# Patient Record
Sex: FEMALE | Race: WHITE | NOT HISPANIC OR LATINO | Employment: FULL TIME | URBAN - METROPOLITAN AREA
[De-identification: names, ages, dates, MRNs, and addresses within clinical notes are randomized per-mention and may not be internally consistent; named-entity substitution may affect disease eponyms.]

---

## 2017-11-16 ENCOUNTER — GENERIC CONVERSION - ENCOUNTER (OUTPATIENT)
Dept: OTHER | Facility: OTHER | Age: 57
End: 2017-11-16

## 2017-11-16 DIAGNOSIS — Z00.00 ENCOUNTER FOR GENERAL ADULT MEDICAL EXAMINATION WITHOUT ABNORMAL FINDINGS: ICD-10-CM

## 2017-11-16 DIAGNOSIS — Z12.31 ENCOUNTER FOR SCREENING MAMMOGRAM FOR MALIGNANT NEOPLASM OF BREAST: ICD-10-CM

## 2018-01-12 NOTE — MISCELLANEOUS
Message   Recorded as Task   Date: 05/25/2016 08:03 AM, Created By: Navjot Mujica   Task Name: Miscellaneous   Assigned To: Laya Summers   Regarding Patient: Ana Marin, Status: Active   CommentHira Samson - 25 May 2016 8:03 AM     TASK CREATED    [facility] called for loretta rx for pt,not scheduled for yrly yet,put order in allscripts        Active Problems    1  Abnormal finding on mammography (793 80) (R92 8)   2  Abnormal glandular Papanicolaou smear of cervix (795 00) (R87 619)   3  Adult-onset obesity (278 00) (E66 9)   4  Bacterial vaginosis (616 10,041 9) (N76 0,B96 89)   5  Cyst of breast, unspecified laterality (610 0) (N60 09)   6  Encounter for routine gynecological examination (V72 31) (Z01 419)   7  Encounter for screening mammogram for malignant neoplasm of breast (V76 12)   (Z12 31)   8  Human papilloma virus infection (079 4) (B97 7)   9  Lack of libido (799 81) (F52 0)   10  Menorrhagia (626 2) (N92 0)   11  Pap smear abnormality of cervix with ASCUS favoring benign (795 01) (R87 610)   12  Pregnancy (V22 2) (Z33 1)   13  Screening for HPV (human papillomavirus) (V73 81) (Z11 51)    Current Meds   1  Multivitamins TABS; daily; Therapy: [id number] to (Last Rx:14Nov2011)  Requested for: [id number] Ordered    Allergies    1  No Known Drug Allergies    Plan  Encounter for screening mammogram for malignant neoplasm of breast    · * MAMMO SCREENING BILATERAL W CAD; Status:Hold For - Scheduling,Retrospective  By Protocol Authorization;  Requested for:[id number];     Signatures   Electronically signed by : Maria Antonia Almaguer, ; May 25 2016  8:04AM EST                       (Author)

## 2018-01-22 VITALS
SYSTOLIC BLOOD PRESSURE: 132 MMHG | HEIGHT: 61 IN | DIASTOLIC BLOOD PRESSURE: 78 MMHG | WEIGHT: 148.4 LBS | BODY MASS INDEX: 28.02 KG/M2

## 2020-07-03 ENCOUNTER — HOSPITAL ENCOUNTER (EMERGENCY)
Facility: HOSPITAL | Age: 60
End: 2020-07-04
Attending: EMERGENCY MEDICINE
Payer: COMMERCIAL

## 2020-07-03 DIAGNOSIS — K81.9 CHOLECYSTITIS: Primary | ICD-10-CM

## 2020-07-03 LAB
BASOPHILS # BLD AUTO: 0.03 THOUSANDS/ΜL (ref 0–0.1)
BASOPHILS NFR BLD AUTO: 0 % (ref 0–1)
EOSINOPHIL # BLD AUTO: 0.04 THOUSAND/ΜL (ref 0–0.61)
EOSINOPHIL NFR BLD AUTO: 0 % (ref 0–6)
ERYTHROCYTE [DISTWIDTH] IN BLOOD BY AUTOMATED COUNT: 13.6 % (ref 11.6–15.1)
HCT VFR BLD AUTO: 49.6 % (ref 34.8–46.1)
HGB BLD-MCNC: 15.8 G/DL (ref 11.5–15.4)
IMM GRANULOCYTES # BLD AUTO: 0.07 THOUSAND/UL (ref 0–0.2)
IMM GRANULOCYTES NFR BLD AUTO: 0 % (ref 0–2)
LYMPHOCYTES # BLD AUTO: 1.95 THOUSANDS/ΜL (ref 0.6–4.47)
LYMPHOCYTES NFR BLD AUTO: 9 % (ref 14–44)
MCH RBC QN AUTO: 28.4 PG (ref 26.8–34.3)
MCHC RBC AUTO-ENTMCNC: 31.9 G/DL (ref 31.4–37.4)
MCV RBC AUTO: 89 FL (ref 82–98)
MONOCYTES # BLD AUTO: 0.83 THOUSAND/ΜL (ref 0.17–1.22)
MONOCYTES NFR BLD AUTO: 4 % (ref 4–12)
NEUTROPHILS # BLD AUTO: 18.75 THOUSANDS/ΜL (ref 1.85–7.62)
NEUTS SEG NFR BLD AUTO: 87 % (ref 43–75)
PLATELET # BLD AUTO: 253 THOUSANDS/UL (ref 149–390)
PMV BLD AUTO: 11.9 FL (ref 8.9–12.7)
RBC # BLD AUTO: 5.57 MILLION/UL (ref 3.81–5.12)
WBC # BLD AUTO: 21.67 THOUSAND/UL (ref 4.31–10.16)

## 2020-07-03 PROCEDURE — 81001 URINALYSIS AUTO W/SCOPE: CPT | Performed by: EMERGENCY MEDICINE

## 2020-07-03 PROCEDURE — 99285 EMERGENCY DEPT VISIT HI MDM: CPT

## 2020-07-03 PROCEDURE — 36415 COLL VENOUS BLD VENIPUNCTURE: CPT | Performed by: EMERGENCY MEDICINE

## 2020-07-03 PROCEDURE — 99285 EMERGENCY DEPT VISIT HI MDM: CPT | Performed by: EMERGENCY MEDICINE

## 2020-07-03 PROCEDURE — 80053 COMPREHEN METABOLIC PANEL: CPT | Performed by: EMERGENCY MEDICINE

## 2020-07-03 PROCEDURE — 83690 ASSAY OF LIPASE: CPT | Performed by: EMERGENCY MEDICINE

## 2020-07-03 PROCEDURE — 85025 COMPLETE CBC W/AUTO DIFF WBC: CPT | Performed by: EMERGENCY MEDICINE

## 2020-07-04 ENCOUNTER — ANESTHESIA (INPATIENT)
Dept: PERIOP | Facility: HOSPITAL | Age: 60
End: 2020-07-04
Payer: COMMERCIAL

## 2020-07-04 ENCOUNTER — ANESTHESIA EVENT (INPATIENT)
Dept: PERIOP | Facility: HOSPITAL | Age: 60
End: 2020-07-04
Payer: COMMERCIAL

## 2020-07-04 ENCOUNTER — APPOINTMENT (EMERGENCY)
Dept: CT IMAGING | Facility: HOSPITAL | Age: 60
End: 2020-07-04
Payer: COMMERCIAL

## 2020-07-04 ENCOUNTER — APPOINTMENT (INPATIENT)
Dept: RADIOLOGY | Facility: HOSPITAL | Age: 60
End: 2020-07-04
Payer: COMMERCIAL

## 2020-07-04 ENCOUNTER — HOSPITAL ENCOUNTER (OUTPATIENT)
Facility: HOSPITAL | Age: 60
Setting detail: OUTPATIENT SURGERY
Discharge: HOME | End: 2020-07-05
Attending: SPECIALIST | Admitting: SPECIALIST
Payer: COMMERCIAL

## 2020-07-04 ENCOUNTER — APPOINTMENT (EMERGENCY)
Dept: RADIOLOGY | Facility: HOSPITAL | Age: 60
End: 2020-07-04
Payer: COMMERCIAL

## 2020-07-04 VITALS
HEART RATE: 97 BPM | RESPIRATION RATE: 18 BRPM | WEIGHT: 140 LBS | DIASTOLIC BLOOD PRESSURE: 97 MMHG | SYSTOLIC BLOOD PRESSURE: 170 MMHG | HEIGHT: 62 IN | TEMPERATURE: 97 F | OXYGEN SATURATION: 96 % | BODY MASS INDEX: 25.76 KG/M2

## 2020-07-04 DIAGNOSIS — Z90.49 S/P LAPAROSCOPIC CHOLECYSTECTOMY: ICD-10-CM

## 2020-07-04 DIAGNOSIS — K80.00 ACUTE CHOLECYSTITIS DUE TO BILIARY CALCULUS: Primary | ICD-10-CM

## 2020-07-04 PROBLEM — K80.20 GALL STONES: Status: ACTIVE | Noted: 2020-07-04

## 2020-07-04 LAB
ALBUMIN SERPL BCP-MCNC: 4.2 G/DL (ref 3.5–5)
ALBUMIN SERPL BCP-MCNC: 4.7 G/DL (ref 3.4–4.8)
ALP SERPL-CCNC: 125 U/L (ref 46–116)
ALP SERPL-CCNC: 99.8 U/L (ref 35–140)
ALT SERPL W P-5'-P-CCNC: 19 U/L (ref 5–54)
ALT SERPL W P-5'-P-CCNC: 30 U/L (ref 12–78)
ANION GAP SERPL CALCULATED.3IONS-SCNC: 10 MMOL/L (ref 4–13)
ANION GAP SERPL CALCULATED.3IONS-SCNC: 11 MMOL/L (ref 4–13)
AST SERPL W P-5'-P-CCNC: 18 U/L (ref 5–45)
AST SERPL W P-5'-P-CCNC: 23 U/L (ref 15–41)
BACTERIA UR QL AUTO: ABNORMAL /HPF
BASOPHILS # BLD AUTO: 0.04 THOUSANDS/ΜL (ref 0–0.1)
BASOPHILS NFR BLD AUTO: 0 % (ref 0–1)
BILIRUB SERPL-MCNC: 0.39 MG/DL (ref 0.3–1.2)
BILIRUB SERPL-MCNC: 0.5 MG/DL (ref 0.2–1)
BILIRUB UR QL STRIP: NEGATIVE
BUN SERPL-MCNC: 12 MG/DL (ref 5–25)
BUN SERPL-MCNC: 16 MG/DL (ref 6–20)
CALCIUM SERPL-MCNC: 10 MG/DL (ref 8.4–10.2)
CALCIUM SERPL-MCNC: 9.2 MG/DL (ref 8.3–10.1)
CHLORIDE SERPL-SCNC: 102 MMOL/L (ref 100–108)
CHLORIDE SERPL-SCNC: 104 MMOL/L (ref 96–108)
CLARITY UR: CLEAR
CO2 SERPL-SCNC: 24 MMOL/L (ref 22–33)
CO2 SERPL-SCNC: 25 MMOL/L (ref 21–32)
COLOR UR: YELLOW
CREAT SERPL-MCNC: 0.85 MG/DL (ref 0.4–1.1)
CREAT SERPL-MCNC: 0.88 MG/DL (ref 0.6–1.3)
EOSINOPHIL # BLD AUTO: 0 THOUSAND/ΜL (ref 0–0.61)
EOSINOPHIL NFR BLD AUTO: 0 % (ref 0–6)
ERYTHROCYTE [DISTWIDTH] IN BLOOD BY AUTOMATED COUNT: 13.3 % (ref 11.6–15.1)
GFR SERPL CREATININE-BSD FRML MDRD: 73 ML/MIN/1.73SQ M
GFR SERPL CREATININE-BSD FRML MDRD: 76 ML/MIN/1.73SQ M
GLUCOSE SERPL-MCNC: 147 MG/DL (ref 65–140)
GLUCOSE SERPL-MCNC: 158 MG/DL (ref 65–140)
GLUCOSE UR STRIP-MCNC: NEGATIVE MG/DL
HCT VFR BLD AUTO: 49.8 % (ref 34.8–46.1)
HGB BLD-MCNC: 15.7 G/DL (ref 11.5–15.4)
HGB UR QL STRIP.AUTO: ABNORMAL
IMM GRANULOCYTES # BLD AUTO: 0.13 THOUSAND/UL (ref 0–0.2)
IMM GRANULOCYTES NFR BLD AUTO: 1 % (ref 0–2)
KETONES UR STRIP-MCNC: NEGATIVE MG/DL
LEUKOCYTE ESTERASE UR QL STRIP: NEGATIVE
LIPASE SERPL-CCNC: 8 U/L (ref 13–60)
LYMPHOCYTES # BLD AUTO: 1.37 THOUSANDS/ΜL (ref 0.6–4.47)
LYMPHOCYTES NFR BLD AUTO: 7 % (ref 14–44)
MCH RBC QN AUTO: 29 PG (ref 26.8–34.3)
MCHC RBC AUTO-ENTMCNC: 31.5 G/DL (ref 31.4–37.4)
MCV RBC AUTO: 92 FL (ref 82–98)
MONOCYTES # BLD AUTO: 0.92 THOUSAND/ΜL (ref 0.17–1.22)
MONOCYTES NFR BLD AUTO: 5 % (ref 4–12)
MUCOUS THREADS UR QL AUTO: ABNORMAL
NEUTROPHILS # BLD AUTO: 17.65 THOUSANDS/ΜL (ref 1.85–7.62)
NEUTS SEG NFR BLD AUTO: 87 % (ref 43–75)
NITRITE UR QL STRIP: NEGATIVE
NON-SQ EPI CELLS URNS QL MICRO: ABNORMAL /HPF
NRBC BLD AUTO-RTO: 0 /100 WBCS
PH UR STRIP.AUTO: 5 [PH]
PLATELET # BLD AUTO: 275 THOUSANDS/UL (ref 149–390)
PMV BLD AUTO: 11.9 FL (ref 8.9–12.7)
POTASSIUM SERPL-SCNC: 4 MMOL/L (ref 3.5–5.3)
POTASSIUM SERPL-SCNC: 4.6 MMOL/L (ref 3.5–5)
PROT SERPL-MCNC: 7.8 G/DL (ref 6.4–8.3)
PROT SERPL-MCNC: 8.1 G/DL (ref 6.4–8.2)
PROT UR STRIP-MCNC: NEGATIVE MG/DL
RBC # BLD AUTO: 5.42 MILLION/UL (ref 3.81–5.12)
RBC #/AREA URNS AUTO: ABNORMAL /HPF
SARS-COV-2 RNA RESP QL NAA+PROBE: NEGATIVE
SODIUM SERPL-SCNC: 138 MMOL/L (ref 133–145)
SODIUM SERPL-SCNC: 138 MMOL/L (ref 136–145)
SP GR UR STRIP.AUTO: >=1.03 (ref 1–1.03)
UROBILINOGEN UR QL STRIP.AUTO: 0.2 E.U./DL
WBC # BLD AUTO: 20.11 THOUSAND/UL (ref 4.31–10.16)
WBC #/AREA URNS AUTO: ABNORMAL /HPF

## 2020-07-04 PROCEDURE — 80053 COMPREHEN METABOLIC PANEL: CPT | Performed by: SPECIALIST

## 2020-07-04 PROCEDURE — 87635 SARS-COV-2 COVID-19 AMP PRB: CPT | Performed by: SPECIALIST

## 2020-07-04 PROCEDURE — 76705 ECHO EXAM OF ABDOMEN: CPT

## 2020-07-04 PROCEDURE — 96374 THER/PROPH/DIAG INJ IV PUSH: CPT

## 2020-07-04 PROCEDURE — 87205 SMEAR GRAM STAIN: CPT | Performed by: SPECIALIST

## 2020-07-04 PROCEDURE — 47562 LAPAROSCOPIC CHOLECYSTECTOMY: CPT | Performed by: PHYSICIAN ASSISTANT

## 2020-07-04 PROCEDURE — 47562 LAPAROSCOPIC CHOLECYSTECTOMY: CPT | Performed by: SPECIALIST

## 2020-07-04 PROCEDURE — 99221 1ST HOSP IP/OBS SF/LOW 40: CPT | Performed by: PHYSICIAN ASSISTANT

## 2020-07-04 PROCEDURE — 88304 TISSUE EXAM BY PATHOLOGIST: CPT | Performed by: PATHOLOGY

## 2020-07-04 PROCEDURE — 96376 TX/PRO/DX INJ SAME DRUG ADON: CPT

## 2020-07-04 PROCEDURE — 71045 X-RAY EXAM CHEST 1 VIEW: CPT

## 2020-07-04 PROCEDURE — 71275 CT ANGIOGRAPHY CHEST: CPT

## 2020-07-04 PROCEDURE — 87070 CULTURE OTHR SPECIMN AEROBIC: CPT | Performed by: SPECIALIST

## 2020-07-04 PROCEDURE — 74174 CTA ABD&PLVS W/CONTRAST: CPT

## 2020-07-04 PROCEDURE — 85025 COMPLETE CBC W/AUTO DIFF WBC: CPT | Performed by: SPECIALIST

## 2020-07-04 RX ORDER — SODIUM CHLORIDE, SODIUM LACTATE, POTASSIUM CHLORIDE, CALCIUM CHLORIDE 600; 310; 30; 20 MG/100ML; MG/100ML; MG/100ML; MG/100ML
INJECTION, SOLUTION INTRAVENOUS CONTINUOUS PRN
Status: DISCONTINUED | OUTPATIENT
Start: 2020-07-04 | End: 2020-07-04 | Stop reason: SURG

## 2020-07-04 RX ORDER — MORPHINE SULFATE 4 MG/ML
4 INJECTION, SOLUTION INTRAMUSCULAR; INTRAVENOUS ONCE
Status: COMPLETED | OUTPATIENT
Start: 2020-07-04 | End: 2020-07-04

## 2020-07-04 RX ORDER — KETOROLAC TROMETHAMINE 30 MG/ML
15 INJECTION, SOLUTION INTRAMUSCULAR; INTRAVENOUS EVERY 6 HOURS SCHEDULED
Status: DISCONTINUED | OUTPATIENT
Start: 2020-07-04 | End: 2020-07-05 | Stop reason: HOSPADM

## 2020-07-04 RX ORDER — BUPIVACAINE HYDROCHLORIDE AND EPINEPHRINE 5; 5 MG/ML; UG/ML
INJECTION, SOLUTION EPIDURAL; INTRACAUDAL; PERINEURAL AS NEEDED
Status: DISCONTINUED | OUTPATIENT
Start: 2020-07-04 | End: 2020-07-04 | Stop reason: HOSPADM

## 2020-07-04 RX ORDER — CEFAZOLIN SODIUM 2 G/50ML
2000 SOLUTION INTRAVENOUS ONCE
Status: DISCONTINUED | OUTPATIENT
Start: 2020-07-04 | End: 2020-07-04 | Stop reason: HOSPADM

## 2020-07-04 RX ORDER — ONDANSETRON 2 MG/ML
INJECTION INTRAMUSCULAR; INTRAVENOUS AS NEEDED
Status: DISCONTINUED | OUTPATIENT
Start: 2020-07-04 | End: 2020-07-04 | Stop reason: SURG

## 2020-07-04 RX ORDER — OXYCODONE HYDROCHLORIDE 5 MG/1
5 TABLET ORAL EVERY 4 HOURS PRN
Status: DISCONTINUED | OUTPATIENT
Start: 2020-07-04 | End: 2020-07-05 | Stop reason: HOSPADM

## 2020-07-04 RX ORDER — SODIUM CHLORIDE, SODIUM LACTATE, POTASSIUM CHLORIDE, CALCIUM CHLORIDE 600; 310; 30; 20 MG/100ML; MG/100ML; MG/100ML; MG/100ML
50 INJECTION, SOLUTION INTRAVENOUS CONTINUOUS
Status: DISCONTINUED | OUTPATIENT
Start: 2020-07-04 | End: 2020-07-04

## 2020-07-04 RX ORDER — ONDANSETRON 2 MG/ML
4 INJECTION INTRAMUSCULAR; INTRAVENOUS ONCE AS NEEDED
Status: DISCONTINUED | OUTPATIENT
Start: 2020-07-04 | End: 2020-07-04 | Stop reason: HOSPADM

## 2020-07-04 RX ORDER — SODIUM CHLORIDE, SODIUM LACTATE, POTASSIUM CHLORIDE, CALCIUM CHLORIDE 600; 310; 30; 20 MG/100ML; MG/100ML; MG/100ML; MG/100ML
75 INJECTION, SOLUTION INTRAVENOUS CONTINUOUS
Status: DISCONTINUED | OUTPATIENT
Start: 2020-07-04 | End: 2020-07-04

## 2020-07-04 RX ORDER — NEOSTIGMINE METHYLSULFATE 1 MG/ML
INJECTION INTRAVENOUS AS NEEDED
Status: DISCONTINUED | OUTPATIENT
Start: 2020-07-04 | End: 2020-07-04 | Stop reason: SURG

## 2020-07-04 RX ORDER — LIDOCAINE HYDROCHLORIDE 10 MG/ML
INJECTION, SOLUTION EPIDURAL; INFILTRATION; INTRACAUDAL; PERINEURAL AS NEEDED
Status: DISCONTINUED | OUTPATIENT
Start: 2020-07-04 | End: 2020-07-04 | Stop reason: SURG

## 2020-07-04 RX ORDER — OXYCODONE HYDROCHLORIDE 10 MG/1
10 TABLET ORAL EVERY 4 HOURS PRN
Status: DISCONTINUED | OUTPATIENT
Start: 2020-07-04 | End: 2020-07-05 | Stop reason: HOSPADM

## 2020-07-04 RX ORDER — HYDROMORPHONE HCL/PF 1 MG/ML
SYRINGE (ML) INJECTION AS NEEDED
Status: DISCONTINUED | OUTPATIENT
Start: 2020-07-04 | End: 2020-07-04 | Stop reason: SURG

## 2020-07-04 RX ORDER — DEXTROSE AND SODIUM CHLORIDE 5; .45 G/100ML; G/100ML
125 INJECTION, SOLUTION INTRAVENOUS CONTINUOUS
Status: DISCONTINUED | OUTPATIENT
Start: 2020-07-04 | End: 2020-07-04

## 2020-07-04 RX ORDER — ROCURONIUM BROMIDE 10 MG/ML
INJECTION, SOLUTION INTRAVENOUS AS NEEDED
Status: DISCONTINUED | OUTPATIENT
Start: 2020-07-04 | End: 2020-07-04 | Stop reason: SURG

## 2020-07-04 RX ORDER — HYDROMORPHONE HCL/PF 1 MG/ML
0.2 SYRINGE (ML) INJECTION
Status: DISCONTINUED | OUTPATIENT
Start: 2020-07-04 | End: 2020-07-05 | Stop reason: HOSPADM

## 2020-07-04 RX ORDER — FENTANYL CITRATE 50 UG/ML
INJECTION, SOLUTION INTRAMUSCULAR; INTRAVENOUS AS NEEDED
Status: DISCONTINUED | OUTPATIENT
Start: 2020-07-04 | End: 2020-07-04 | Stop reason: SURG

## 2020-07-04 RX ORDER — DEXAMETHASONE SODIUM PHOSPHATE 4 MG/ML
INJECTION, SOLUTION INTRA-ARTICULAR; INTRALESIONAL; INTRAMUSCULAR; INTRAVENOUS; SOFT TISSUE AS NEEDED
Status: DISCONTINUED | OUTPATIENT
Start: 2020-07-04 | End: 2020-07-04 | Stop reason: SURG

## 2020-07-04 RX ORDER — METRONIDAZOLE 500 MG/1
500 TABLET ORAL ONCE
Status: DISCONTINUED | OUTPATIENT
Start: 2020-07-04 | End: 2020-07-04 | Stop reason: HOSPADM

## 2020-07-04 RX ORDER — ACETAMINOPHEN 325 MG/1
650 TABLET ORAL EVERY 6 HOURS PRN
Status: DISCONTINUED | OUTPATIENT
Start: 2020-07-04 | End: 2020-07-05 | Stop reason: HOSPADM

## 2020-07-04 RX ORDER — ONDANSETRON 2 MG/ML
4 INJECTION INTRAMUSCULAR; INTRAVENOUS EVERY 4 HOURS PRN
Status: DISCONTINUED | OUTPATIENT
Start: 2020-07-04 | End: 2020-07-05 | Stop reason: HOSPADM

## 2020-07-04 RX ORDER — GLYCOPYRROLATE 0.2 MG/ML
INJECTION INTRAMUSCULAR; INTRAVENOUS AS NEEDED
Status: DISCONTINUED | OUTPATIENT
Start: 2020-07-04 | End: 2020-07-04 | Stop reason: SURG

## 2020-07-04 RX ORDER — OXYCODONE HYDROCHLORIDE 5 MG/1
5 TABLET ORAL EVERY 4 HOURS PRN
Qty: 10 TABLET | Refills: 0 | Status: SHIPPED | OUTPATIENT
Start: 2020-07-04 | End: 2020-07-05 | Stop reason: HOSPADM

## 2020-07-04 RX ORDER — MIDAZOLAM HYDROCHLORIDE 2 MG/2ML
INJECTION, SOLUTION INTRAMUSCULAR; INTRAVENOUS AS NEEDED
Status: DISCONTINUED | OUTPATIENT
Start: 2020-07-04 | End: 2020-07-04 | Stop reason: SURG

## 2020-07-04 RX ORDER — HYDROMORPHONE HCL/PF 1 MG/ML
1 SYRINGE (ML) INJECTION EVERY 4 HOURS PRN
Status: DISCONTINUED | OUTPATIENT
Start: 2020-07-04 | End: 2020-07-04

## 2020-07-04 RX ORDER — HYDROMORPHONE HCL/PF 1 MG/ML
0.4 SYRINGE (ML) INJECTION
Status: DISCONTINUED | OUTPATIENT
Start: 2020-07-04 | End: 2020-07-04 | Stop reason: HOSPADM

## 2020-07-04 RX ORDER — PROPOFOL 10 MG/ML
INJECTION, EMULSION INTRAVENOUS AS NEEDED
Status: DISCONTINUED | OUTPATIENT
Start: 2020-07-04 | End: 2020-07-04 | Stop reason: SURG

## 2020-07-04 RX ORDER — HYDROMORPHONE HCL/PF 1 MG/ML
0.5 SYRINGE (ML) INJECTION
Status: DISCONTINUED | OUTPATIENT
Start: 2020-07-04 | End: 2020-07-04

## 2020-07-04 RX ADMIN — ONDANSETRON 4 MG: 2 INJECTION INTRAMUSCULAR; INTRAVENOUS at 13:49

## 2020-07-04 RX ADMIN — Medication 3.38 G: at 10:38

## 2020-07-04 RX ADMIN — FENTANYL CITRATE 50 MCG: 50 INJECTION, SOLUTION INTRAMUSCULAR; INTRAVENOUS at 13:15

## 2020-07-04 RX ADMIN — KETOROLAC TROMETHAMINE 15 MG: 30 INJECTION, SOLUTION INTRAMUSCULAR at 23:23

## 2020-07-04 RX ADMIN — FENTANYL CITRATE 50 MCG: 50 INJECTION, SOLUTION INTRAMUSCULAR; INTRAVENOUS at 13:48

## 2020-07-04 RX ADMIN — Medication 3.38 G: at 23:23

## 2020-07-04 RX ADMIN — PROPOFOL 50 MG: 10 INJECTION, EMULSION INTRAVENOUS at 13:22

## 2020-07-04 RX ADMIN — HYDROMORPHONE HYDROCHLORIDE 1 MG: 1 INJECTION, SOLUTION INTRAMUSCULAR; INTRAVENOUS; SUBCUTANEOUS at 14:46

## 2020-07-04 RX ADMIN — DEXTROSE AND SODIUM CHLORIDE 125 ML/HR: 5; .45 INJECTION, SOLUTION INTRAVENOUS at 15:11

## 2020-07-04 RX ADMIN — PROPOFOL 150 MG: 10 INJECTION, EMULSION INTRAVENOUS at 13:14

## 2020-07-04 RX ADMIN — HYDROMORPHONE HYDROCHLORIDE 1 MG: 1 INJECTION, SOLUTION INTRAMUSCULAR; INTRAVENOUS; SUBCUTANEOUS at 05:28

## 2020-07-04 RX ADMIN — IOHEXOL 100 ML: 350 INJECTION, SOLUTION INTRAVENOUS at 01:14

## 2020-07-04 RX ADMIN — MORPHINE SULFATE 4 MG: 4 INJECTION INTRAVENOUS at 02:20

## 2020-07-04 RX ADMIN — MIDAZOLAM HYDROCHLORIDE 2 MG: 1 INJECTION, SOLUTION INTRAMUSCULAR; INTRAVENOUS at 13:07

## 2020-07-04 RX ADMIN — KETOROLAC TROMETHAMINE 15 MG: 30 INJECTION, SOLUTION INTRAMUSCULAR at 17:56

## 2020-07-04 RX ADMIN — DEXAMETHASONE SODIUM PHOSPHATE 4 MG: 4 INJECTION, SOLUTION INTRA-ARTICULAR; INTRALESIONAL; INTRAMUSCULAR; INTRAVENOUS; SOFT TISSUE at 13:49

## 2020-07-04 RX ADMIN — MORPHINE SULFATE 4 MG: 4 INJECTION INTRAVENOUS at 03:37

## 2020-07-04 RX ADMIN — ROCURONIUM BROMIDE 50 MG: 10 INJECTION, SOLUTION INTRAVENOUS at 13:15

## 2020-07-04 RX ADMIN — PROPOFOL 50 MG: 10 INJECTION, EMULSION INTRAVENOUS at 14:12

## 2020-07-04 RX ADMIN — SODIUM CHLORIDE, SODIUM LACTATE, POTASSIUM CHLORIDE, AND CALCIUM CHLORIDE: .6; .31; .03; .02 INJECTION, SOLUTION INTRAVENOUS at 13:07

## 2020-07-04 RX ADMIN — DEXTROSE AND SODIUM CHLORIDE 125 ML/HR: 5; .45 INJECTION, SOLUTION INTRAVENOUS at 05:31

## 2020-07-04 RX ADMIN — ONDANSETRON 4 MG: 2 INJECTION INTRAMUSCULAR; INTRAVENOUS at 09:48

## 2020-07-04 RX ADMIN — NEOSTIGMINE METHYLSULFATE 3 MG: 1 INJECTION INTRAVENOUS at 14:40

## 2020-07-04 RX ADMIN — PROPOFOL 30 MG: 10 INJECTION, EMULSION INTRAVENOUS at 14:47

## 2020-07-04 RX ADMIN — LIDOCAINE HYDROCHLORIDE 50 MG: 10 INJECTION, SOLUTION EPIDURAL; INFILTRATION; INTRACAUDAL; PERINEURAL at 13:14

## 2020-07-04 RX ADMIN — GLYCOPYRROLATE 0.4 MG: 0.2 INJECTION, SOLUTION INTRAMUSCULAR; INTRAVENOUS at 14:40

## 2020-07-04 RX ADMIN — MORPHINE SULFATE 4 MG: 4 INJECTION INTRAVENOUS at 00:11

## 2020-07-04 RX ADMIN — Medication 3.38 G: at 17:57

## 2020-07-04 RX ADMIN — Medication 3.38 G: at 05:30

## 2020-07-04 RX ADMIN — HYDROMORPHONE HYDROCHLORIDE 1 MG: 1 INJECTION, SOLUTION INTRAMUSCULAR; INTRAVENOUS; SUBCUTANEOUS at 09:33

## 2020-07-04 NOTE — H&P
H&P Exam - General Surgery   Nelson Baker 64 y o  female MRN: [id number]  Unit/Bed#: 2 [place] [address] Encounter: [id number]    Assessment/Plan     Assessment:  · Symptomatic cholelithiasis, likely calculous cholecystitis -- WBC count 20, constant RUQ abdominal pain with nausea      Plan:  · STAT gallbladder ultrasound, likely plan for laparoscopic cholecystectomy today  · COVID negative  · NPO and IV fluid resuscitation  · Zosyn 3 375g Q6  · PRN analgesics and antiemetic  · No urine pregnancy test needed      History of Present Illness     HPI:  Nelson Baker is a 64 y o  female who is a current every day smoker who presents with constant right upper quadrant abdominal pain that began yesterday evening after having lunch with her daughter  Patient admits she has had similar episodes of pain like this in the past couple years that have resolved spontaneously within a couple hours of onset  Today she describes the pain as dull yet severe and it does not radiate anywhere  She is nauseous and self-induced vomiting last night  She has not been on any antibiotics recently and has not eaten anything out of the ordinary  Denies any difficulty urinating, hematuria, dysuria, flank pain, back pain, jaundice, scleral icterus, diarrhea, melena, changes in bowel movements, history of kidney stones  Review of Systems   Constitutional: Positive for appetite change (anorexia)  Negative for chills, fatigue and fever  HENT: Negative for congestion, rhinorrhea, sore throat and trouble swallowing  Respiratory: Negative for cough, chest tightness, shortness of breath and wheezing  Cardiovascular: Negative for chest pain, palpitations and leg swelling  Gastrointestinal: Positive for abdominal pain, nausea and vomiting (self induced)  Negative for blood in stool and diarrhea  Genitourinary: Negative for decreased urine volume, difficulty urinating, dysuria, flank pain and hematuria     Musculoskeletal: Negative for back pain  Skin: Negative for rash and wound  Neurological: Negative for dizziness, syncope, weakness, light-headedness and headaches  Historical Information   History reviewed  No pertinent past medical history    Past Surgical History:   Procedure Laterality Date    BREAST SURGERY       Social History   Social History     Substance and Sexual Activity   Alcohol Use Yes    Alcohol/week: 1 0 standard drinks    Types: 1 Glasses of wine per week    Frequency: Monthly or less    Drinks per session: 1 or 2    Binge frequency: Never     Social History     Substance and Sexual Activity   Drug Use Never     Social History     Tobacco Use   Smoking Status Current Some Day Smoker    Packs/day: 0 25    Types: Cigarettes   Smokeless Tobacco Never Used     E-Cigarette/Vaping    E-Cigarette Use Never User      E-Cigarette/Vaping Substances    Nicotine No     THC No     CBD No     Flavoring No     Other No      Family History: non-contributory    Meds/Allergies   all medications and allergies reviewed  No Known Allergies    Objective   First Vitals:   Blood Pressure: (!) 167/108 (07/04/20 0523)  Pulse: 98 (07/04/20 0523)  Temperature: (!) 97 2 °F (36 2 °C) (07/04/20 0436)  Temp Source: Oral (07/04/20 0436)  Respirations: 20 (07/04/20 0523)  Height: 5' 2" (157 5 cm) (07/04/20 0436)  Weight - Scale: 68 2 kg (150 lb 5 7 oz) (07/04/20 0436)  SpO2: 92 % (07/04/20 0523)    Current Vitals:   Blood Pressure: (!) 167/108 (07/04/20 0523)  Pulse: 98 (07/04/20 0523)  Temperature: 97 8 °F (36 6 °C) (07/04/20 0523)  Temp Source: Oral (07/04/20 0523)  Respirations: 20 (07/04/20 0523)  Height: 5' 2" (157 5 cm) (07/04/20 0724)  Weight - Scale: 68 2 kg (150 lb 5 7 oz) (07/04/20 0724)  SpO2: 92 % (07/04/20 0523)    No intake or output data in the 24 hours ending 07/04/20 0838    Invasive Devices     Peripheral Intravenous Line            Peripheral IV 07/03/20 Right Antecubital less than 1 day                Physical Exam Constitutional: She is oriented to person, place, and time  She appears well-developed and well-nourished  She is cooperative  Non-toxic appearance  She is not intubated  HENT:   Head: Normocephalic and atraumatic  Not macrocephalic and not microcephalic  Head is without raccoon's eyes, without Conner's sign, without right periorbital erythema and without left periorbital erythema  Right Ear: External ear normal    Left Ear: External ear normal    Nose: Nose normal    Eyes: Pupils are equal, round, and reactive to light  Conjunctivae are normal  Right eye exhibits no discharge  Left eye exhibits no discharge  Right conjunctiva is not injected  Right conjunctiva has no hemorrhage  Left conjunctiva is not injected  Left conjunctiva has no hemorrhage  No scleral icterus  Cardiovascular: Normal rate and regular rhythm  Pulmonary/Chest: Effort normal and breath sounds normal  No stridor  No apnea, no tachypnea and no bradypnea  She is not intubated  No respiratory distress  She has no decreased breath sounds  She has no wheezes  She has no rhonchi  Abdominal: Soft  Normal appearance and bowel sounds are normal  She exhibits no distension  There is tenderness in the right upper quadrant  There is no rigidity, no rebound and no guarding  No hernia  Neurological: She is alert and oriented to person, place, and time  She is not disoriented  No cranial nerve deficit  GCS eye subscore is 4  GCS verbal subscore is 5  GCS motor subscore is 6  Skin: Skin is warm, dry and intact  Capillary refill takes less than 2 seconds  No rash noted  She is not diaphoretic  Psychiatric: She has a normal mood and affect  Her speech is normal and behavior is normal    Vitals reviewed  Lab Results:   I have personally reviewed pertinent lab results    , CBC:   Lab Results   Component Value Date    WBC 20 11 (H) 07/04/2020    HGB 15 7 (H) 07/04/2020    HCT 49 8 (H) 07/04/2020    MCV 92 07/04/2020     07/04/2020    MCH 29 0 07/04/2020    MCHC 31 5 07/04/2020    RDW 13 3 07/04/2020    MPV 11 9 07/04/2020    NRBC 0 07/04/2020   , CMP:   Lab Results   Component Value Date    SODIUM 138 07/04/2020    K 4 0 07/04/2020     07/04/2020    CO2 25 07/04/2020    BUN 12 07/04/2020    CREATININE 0 88 07/04/2020    CALCIUM 9 2 07/04/2020    AST 18 07/04/2020    ALT 30 07/04/2020    ALKPHOS 125 (H) 07/04/2020    EGFR 73 07/04/2020   , Coagulation: No results found for: PT, INR, APTT  Imaging: I have personally reviewed pertinent reports  and I have personally reviewed pertinent films in PACS  EKG, Pathology, and Other Studies: I have personally reviewed pertinent reports  Code Status: No Order  Advance Directive and Living Will:      Power of :    POLST:      Counseling / Coordination of Care  Total floor / unit time spent today 40 minutes  Greater than 50% of total time was spent with the patient and / or family counseling and / or coordination of care  A description of the counseling / coordination of care: Obtaining patient history, performing physical exam, reviewing pertinent labs and imaging, discussed management with attending physician

## 2020-07-04 NOTE — UTILIZATION REVIEW
Initial Clinical Review    Admission: Date/Time/Statement: Admission Orders (From admission, onward)     Ordered        07/04/20 0835  Inpatient Admission  Once                   Orders Placed This Encounter   Procedures    Inpatient Admission     Standing Status:   Standing     Number of Occurrences:   1     Order Specific Question:   Admitting Physician     Answer:   Estela Sierra     Order Specific Question:   Level of Care     Answer:   Med Surg [16]     Order Specific Question:   Estimated length of stay     Answer:   More than 2 Midnights     Order Specific Question:   Certification     Answer:   I certify that inpatient services are medically necessary for this patient for a duration of greater than two midnights  See H&P and MD Progress Notes for additional information about the patient's course of treatment  No chief complaint on file  Assessment/Plan: 65 yo female transferred from [facility] ED to [facility] with pmh smoking   Pt with c/o right sided flank pain began after eating  Dinner last night  Pain sharp and severe does not radiate associated with nausea and self vomiting last night  WBC 20 ,CTA chest abdomen pelvis Large gallstone at the gallbladder neck with mild pericholecystic inflammatory change  Gallbladder ultrasound recommended to exclude possible underlying cholecystitis  Patient admitted with symptomatic cholelithiasis likely calculous cholecystitis   Plan for  gallbladder us , likely plan for laparoscopic cholecystectomy today  COVID negative  Continue NPO, IVF resuscitation, IV Zosyn q6 prn analgesics antiemetic     OP NOTE  Procedure(s):  CHOLECYSTECTOMY LAPAROSCOPIC  Operative Findings:  Markedly inflamed thick-walled gallbladder  Mucocele of the gallbladder  Large stone stuck in the neck of the gallbladder  Short cystic duct  7/5/20  Assessment:  POD#1 s/p laparoscopic cholecystectomy for acute calculous cholecystitis  Plan:  · Plan for discharge today  · Patient did not want a narcotic script  · Work note written for patient  WBC count 20 > 16     Blood pressure 105/70, pulse 99, temperature 98 2 °F (36 8 °C), temperature source Oral, resp  rate 16, height 5' 2" (1 575 m), weight 68 2 kg (150 lb 5 7 oz), SpO2 95 %  ,Body mass index is 27 5 kg/m²      ED Triage Vitals   Temperature Pulse Respirations Blood Pressure SpO2   07/04/20 0436 07/04/20 0523 07/04/20 0523 07/04/20 0523 07/04/20 0523   (!) 97 2 °F (36 2 °C) 98 20 (!) 167/108 92 %      Temp Source Heart Rate Source Patient Position - Orthostatic VS BP Location FiO2 (%)   07/04/20 0436 -- 07/04/20 0523 07/04/20 0523 --   Oral  Sitting Right arm       Pain Score       07/04/20 0528       8        Wt Readings from Last 1 Encounters:   07/04/20 68 2 kg (150 lb 5 7 oz)     Additional Vital Signs:   07/04/20 08:42:09  98 7 °F (37 1 °C)  91  16  151/98  116  95 %  Lying   07/04/20 0523  97 8 °F (36 6 °C)  98  20  167/108Abnormal   128  92 %       Pertinent Labs/Diagnostic Test Results:   Cta dissection protocol chest abdomen pelvis No evidence of aortic aneurysm or dissection  Large gallstone at the gallbladder neck with mild pericholecystic inflammatory change  Gallbladder ultrasound recommended to exclude possible underlying cholecystitis  Scattered groundglass nodules throughout the right upper and middle lobe measuring up to 6 mm  Based on current Fleischner Society 2017 Guidelines on incidental pulmonary nodule, followup noncontrast CT is recommended at 6-12 months from the initial   examination to confirm persistence; if stable at that time, additional followup CT is recommended for every 2 years until 5 years of stability is demonstrated    Results from last 7 days   Lab Units 07/04/20  0637   SARS-COV-2  Negative     Results from last 7 days   Lab Units 07/04/20  0526 07/03/20  2337   WBC Thousand/uL 20 11* 21 67*   HEMOGLOBIN g/dL 15 7* 15 8*   HEMATOCRIT % 49 8* 49 6*   PLATELETS Thousands/uL 275 253   NEUTROS ABS Thousands/µL 17 65* 18 75*     Results from last 7 days   Lab Units 07/04/20  0526 07/03/20  2337   SODIUM mmol/L 138 138   POTASSIUM mmol/L 4 0 4 6   CHLORIDE mmol/L 102 104   CO2 mmol/L 25 24   ANION GAP mmol/L 11 10   BUN mg/dL 12 16   CREATININE mg/dL 0 88 0 85   EGFR ml/min/1 73sq m 73 76   CALCIUM mg/dL 9 2 10 0     Results from last 7 days   Lab Units 07/04/20  0526 07/03/20  2337   AST U/L 18 23   ALT U/L 30 19   ALK PHOS U/L 125* 99 8   TOTAL PROTEIN g/dL 8 1 7 8   ALBUMIN g/dL 4 2 4 7   TOTAL BILIRUBIN mg/dL 0 50 0 39     Results from last 7 days   Lab Units 07/04/20  0526 07/03/20  2337   GLUCOSE RANDOM mg/dL 147* 158*     Results from last 7 days   Lab Units 07/03/20  2337   LIPASE u/L 8*     Results from last 7 days   Lab Units 07/03/20  2344   CLARITY UA  Clear   COLOR UA  Yellow   SPEC GRAV UA  >=1 030   PH UA  5 0   GLUCOSE UA mg/dl Negative   KETONES UA mg/dl Negative   BLOOD UA  Trace-Intact*   PROTEIN UA mg/dl Negative   NITRITE UA  Negative   BILIRUBIN UA  Negative   UROBILINOGEN UA E U /dl 0 2   LEUKOCYTES UA  Negative   WBC UA /hpf 0-5   RBC UA /hpf 0-5   BACTERIA UA /hpf Moderate*   EPITHELIAL CELLS WET PREP /hpf Moderate*   MUCUS THREADS  Occasional*       History reviewed  No pertinent past medical history  Present on Admission:   Gall stones   Acute cholecystitis due to biliary calculus      Admitting Diagnosis: Cholecystitis [K81 9]  Age/Sex: 64 y o  female  Admission Orders:  gmf  Npo    Scheduled Medications:    Medications:  piperacillin-tazobactam 3 375 g Intravenous Q6H     Continuous IV Infusions:    dextrose 5 % and sodium chloride 0 45 % 125 mL/hr Intravenous Continuous     PRN Meds:    HYDROmorphone 0 5 mg Intravenous Q3H PRN   HYDROmorphone 1 mg Intravenous Q4H PRN x2   naloxone 0 04 mg Intravenous Q1MIN PRN   ondansetron 4 mg Intravenous Q4H PRN x1       None    Network Utilization Review Department  [email or web address]  ATTENTION: Please call with any questions or concerns to [phone] and carefully listen to the prompts so that you are directed to the right person  All voicemails are confidential   Khurram Julisa all requests for admission clinical reviews, approved or denied determinations and any other requests to dedicated fax number below belonging to the campus where the patient is receiving treatment   List of dedicated fax numbers for the Facilities:  [address] DENIALS (Administrative/Medical Necessity) [phone]   [address] (Maternity/NICU/Pediatrics) [phone]   Minor Art [phone]   Rajesh Newby [phone]   Ge Brooks [phone]   Sylvia Friedman [phone]   [address] [place] [address] [address] [place] [address] [phone]   [facility]  [phone]   [address] [facility] [address]  [address] [facility] [address] [facility] [phone]

## 2020-07-04 NOTE — OP NOTE
General SurgeryCHOLECYSTECTOMY LAPAROSCOPIC Op Note    Rica Hutson  7/4/2020    Pre-op Diagnosis:   Acute cholecystitis due to biliary calculus [K80 00]    PMH  History reviewed  No pertinent past medical history  Post-op Diagnosis:   Patient Active Problem List   Diagnosis    Gall stones    Acute cholecystitis due to biliary calculus       Procedure(s):  CHOLECYSTECTOMY LAPAROSCOPIC    Surgeon(s):  MD Chitra Martin PA-C    Anesthesia:  General    Operative Findings:  Markedly inflamed thick-walled gallbladder  Mucocele of the gallbladder  Large stone stuck in the neck of the gallbladder  Short cystic duct    Assistant:  Ms Chitra Wilson PA-C  [facility] [employer] was utilized as a first assistant as there is no surgical residency program at [facility]    Staff:   Circulator: Jenn Saucedo RN  Scrub Person: Carolyn Lemus RN    Rica Hutson was identified by me  Proper consent was obtained  The risks, benefits, alternatives,and probabilities of success were discussed in detail with no guarantee made as to outcome  All questions were answered to the patient's satisfaction  Site was marked prior coming to OR    Operative site was cleansed with ChloraPrep and draped in usual sterile fashion  Rica Hutson was given pre-operative antibiotics  Body mass index is 27 5 kg/m²  Rica Bis is ASA 2E and Fire risk- 3  Rica Bis was re-identified in the OR  Site was identified and detailed timeout was performed with Anesthesia and OR staff  Infraumbilical transverse incision was made  The skin and subcutaneous tissue was cut along the line of incision  With open Geovanni technique a 10mm port was placed  Pneumoperitoneum was created with the pressure marked up to 15 mmHg and maintained during the procedure with high flow carbon dioxide  Gallbladder was identified    Three other ports were placed, one in the subxiphoid 10mm subcostal, one 5mm midclavicular subcostal and other one 5mm ant axillary subcostal   Gallbladder is markedly distended and could not be grasped with the grasper so Endo aspirated was introduced and approximately 30 cc of turbid clear fluid was drained and aspirated  Gallbladder was grasped with a grasper introduced from the lateralmost 5 mm port at the fundus and was retracted upward laterally  Another grasper was introduced from the midclavicular port 5 mm and Iglesias's pouch of the gallbladder was grasped and was retracted downward and laterally   Patient has a very thick wall short cystic duct  The infundibulum of the gallbladder and cystic duct were clearly identified as well as the cystic artery  After obtaining the critical view, Cystic duct was doubly clipped and cut between the clips  Cystic artery was doubly clipped and cut between the clips  Gallbladder was then removed from the gallbladder bed with Bovie dissection  During dissection gallbladder opened up and the stone was large in size came out of the gallbladder which was retrieved in an Endo pouch Proper hemostasis was achieved  No active bleeding was noted  The gallbladder was then placed in the Endo pouch and was delivered through the infraumbilical transverse incision  1 x 1 both endo-pouch is were removed 1 with the gallbladder and 1 with stone    All the 10 mm port sites were closed in double layers with figure of 8-0 Vicryl, 2-0 for the sheath and the skin was approximated with subcuticular Monocryl  Remaining all 5 mm ports were stitch with a single layer with subcuticular Monocryl skin approximation All the ports site were thoroughly infiltrated with Marcaine with Epinephrine  Sterile dressing was applied to all port site incisions    Kyrie Lincoln tolerated the procedure well, remain stable during and after the procedure  At the end of the procedure all the instruments, needle and sponge counts were noted to be correct     Sterile dresing was applied and patient was transfered to recovery area in stable condition  Estimated Blood Loss:  Minimal    Specimens:  Order Name Source Comment Collection Info Order Time   BODY FLUID CULTURE AND GRAM STAIN Gallbladder  Collected By: Cherelle Mcqueen MD 7/4/2020  1:42 PM   TISSUE EXAM Gallbladder  Collected By: Cherelle Mcqueen MD 7/4/2020  1:35 PM         Drains:  [REMOVED] NG/OG/Enteral Tube Orogastric 18 Fr Right mouth (Removed)       Complications:  None    Total OR Time  * Missing case tracking time(s) *   or    I was present for the entire procedure No qualified resident was available  A physician's assistant was required due to the intensity of the surgery  Physician assistant was required for camera operation, hemostasis retraction and the closure of the surgical wound  Physician assistant was present during the entire procedure      Patient Disposition:  PACU       Cherelle Mcqueen MD [facility]  Date: 7/4/2020  Time: 2:53 PM  Copy to PCP: Syed Aleman MD

## 2020-07-04 NOTE — LETTER
[address] [facility] [address]  [place] [address]  Dept: [phone]    July 5, 2020     Patient: Corrine Art   YOB: 1960   Date of Visit: 7/4/2020       To Whom it May Concern:    Corrine Art is under my professional care  She was seen in the hospital from 7/4/2020   to 7/5/20  She may return to work on 7/10/20, or earlier if patient feels up to it, with the following limitations : No strenuous exercise or heavy lifting over 15 pounds until 8/4/20 or until cleared by a physician  If you have any questions or concerns, please don't hesitate to call           Sincerely,                  Sharon Lau PA-C

## 2020-07-04 NOTE — DISCHARGE INSTRUCTIONS
Post-Operative Care Instructions      1  General: You may feel pulling sensations around the wound or funny aches and pains around the incisions  This is normal  Even minor surgery is a change in your body and this is your body's reaction to it  If you have had abdominal surgery, it may help to support the incision with a small pillow or blanket for comfort when moving or coughing  2  Wound care: You may remove the bandages over the wounds on July 6  Allow the Steri-Strips under the bandages to fall off by themselves  3  Showering: You may shower after removing the bandages on July 6  Do not soak wound in a bath, hot tub, pool, lake, etc  Do not scrub or use exfoliants on the surgical wounds  4  Activity: You may go up and down stairs, walk as much as you are comfortable, but walk at least 3 times each day  If you have had abdominal surgery, do not perform any strenuous exercise or lift anything heavier than 10-15 pounds for at least 3 weeks, unless cleared by your physician  5  Diet: You may resume a low-fat diet    6  Medications: Resume all of your previous medications, unless told otherwise by the doctor  A good option for pain control is to start with acetaminophen(Tylenol) 650mg and ibuprofen(Advil) 600mg and alternate taking them every 3 hours  If this is not sufficient then you make take the narcotic pain medicine as prescribed  You do not need to take the narcotic pain medication unless you are having significant pain and discomfort  Insure that you do not take more than 4000 mg of Tylenol per day  7  Driving: You will need someone to drive you home on the day of surgery  Do not drive or make any important decisions while on narcotic pain medication or for up to 24 hours after anesthesia for surgery  Generally, you may drive when you're off all narcotic pain medications  8  Upset Stomach: You may take Maalox, Tums, or similar items for an upset stomach   If your narcotic pain medication causes an upset stomach, do not take it on an empty stomach  Try taking it with at least some crackers or toast      9  Constipation: Patients often experienced constipation after surgery  You may take over-the-counter medication for this, such as Metamucil, Senokot, Colace, milk of magnesia, etc  If you experience significant nausea or vomiting after abdominal surgery, call the office before trying any of these medications  10  Call the office: If you are experiencing any of the following: fevers above 101 5°, significant nausea or vomiting, if the wound develops drainage and/or excessive redness around the wound, or if you have significant diarrhea or other worsening symptoms      11  Pain: A prescription for narcotic pain medication will be sent to your pharmacy

## 2020-07-04 NOTE — ED PROVIDER NOTES
History  Chief Complaint   Patient presents with    Flank Pain     right side flank pain started about 1700hrs      This is a 74-year-old female presents to the emergency department complaining of right sided flank pain  Patient states that the pain began just after eating dinner last night  She states that pain is sharp and severe  Nothing makes it better or worse  Patient states pain does not radiate  When she points to where the pain, is she points underneath her right breast on the lateral aspect  Patient denies nausea or vomiting  Patient denies any changes in bowel or bladder  Patient denies any urinary symptoms  The patient denies fevers or chills  None       History reviewed  No pertinent past medical history  History reviewed  No pertinent surgical history  History reviewed  No pertinent family history  I have reviewed and agree with the history as documented  E-Cigarette/Vaping     E-Cigarette/Vaping Substances     Social History     Tobacco Use    Smoking status: Not on file   Substance Use Topics    Alcohol use: Not on file    Drug use: Not on file       Review of Systems   All other systems reviewed and are negative        Physical Exam  Physical Exam  Constitutional:  Vital signs reviewed, patient appears nontoxic, appears uncomfortable  Eyes: Pupils equal round reactive to light and accommodation, extraocular muscles intact  HEENT: trachea midline, no JVD, moist mucous membranes  Respiratory: lung sounds clear throughout, no rhonchi, no rales  Cardiovascular: regular rate rhythm, no murmurs or rubs, no chest wall tenderness  Abdomen: soft, mild right upper quadrant tenderness, nondistended, no rebound or guarding  Back: no CVA tenderness, normal inspection  Extremities: no edema, pulses equal in all 4 extremities  Neuro: awake, alert, oriented, no focal weakness  Skin: warm, dry, intact, no rashes noted    Vital Signs  ED Triage Vitals   Temperature Pulse Respirations Blood Pressure SpO2   07/03/20 2320 07/03/20 2320 07/03/20 2320 07/03/20 2323 07/03/20 2320   (!) 97 °F (36 1 °C) 89 17 (!) 170/102 99 %      Temp Source Heart Rate Source Patient Position - Orthostatic VS BP Location FiO2 (%)   07/03/20 2320 07/03/20 2320 07/03/20 2320 07/03/20 2320 --   Tympanic Monitor Sitting Right arm       Pain Score       07/03/20 2320       7           Vitals:    07/03/20 2320 07/03/20 2323 07/04/20 0136 07/04/20 0342   BP:  (!) 170/102 (!) 173/62 170/97   Pulse: 89  102 97   Patient Position - Orthostatic VS: Sitting            Visual Acuity      ED Medications  Medications   ceFAZolin (ANCEF) IVPB (premix) 2,000 mg 50 mL (has no administration in time range)   metroNIDAZOLE (FLAGYL) tablet 500 mg (has no administration in time range)   morphine (PF) 4 mg/mL injection 4 mg (4 mg Intravenous Given 7/4/20 0011)   iohexol (OMNIPAQUE) 350 MG/ML injection (SINGLE-DOSE) 100 mL (100 mL Intravenous Given 7/4/20 0114)   morphine (PF) 4 mg/mL injection 4 mg (4 mg Intravenous Given 7/4/20 0220)   morphine (PF) 4 mg/mL injection 4 mg (4 mg Intravenous Given 7/4/20 3460)       Diagnostic Studies  Results Reviewed     None                 CTA dissection protocol chest abdomen pelvis w wo contrast   Final Result by Man Low MD (07/04 0157)      No evidence of aortic aneurysm or dissection  Large gallstone at the gallbladder neck with mild pericholecystic inflammatory change  Gallbladder ultrasound recommended to exclude possible underlying cholecystitis  Scattered groundglass nodules throughout the right upper and middle lobe measuring up to 6 mm  Based on current Fleischner Society 2017 Guidelines on incidental pulmonary nodule, followup noncontrast CT is recommended at 6-12 months from the initial    examination to confirm persistence; if stable at that time, additional followup CT is recommended for every 2 years until 5 years of stability is demonstrated        Findings discussed with Dr Mickael Skiff at 1:57 AM, 7/4/2020                     Workstation performed: [id number]         XR chest 1 view portable    (Results Pending)              Procedures  Procedures         ED Course  ED Course as of Jul 04 0350   Sat Jul 04, 2020   0309 I discussed the case with Dr Genevieve De La O from surgery  He agrees that the patient needs surgery  He suggested discussing the patient with [facility] surgeons  7845 As per Karishma Colmenares at [facility], the patient was accepted by Dr Chucky Mckinley at [facility]  US AUDIT      Most Recent Value   Initial Alcohol Screen: US AUDIT-C    1  How often do you have a drink containing alcohol? 2 Filed at: 07/03/2020 2325   2  How many drinks containing alcohol do you have on a typical day you are drinking? 1 Filed at: 07/03/2020 2325   3a  Male UNDER 65: How often do you have five or more drinks on one occasion? 0 Filed at: 07/03/2020 2325   3b  FEMALE Any Age, or MALE 65+: How often do you have 4 or more drinks on one occassion? 0 Filed at: 07/03/2020 2325   Audit-C Score  3 Filed at: 07/03/2020 2325                  DANDRE/DAST-10      Most Recent Value   How many times in the past year have you    Used an illegal drug or used a prescription medication for non-medical reasons? Never Filed at: 07/03/2020 2325                                MDM  Number of Diagnoses or Management Options  Diagnosis management comments: This is a 80-year-old female presented to the emergency department complaining of right flank tenderness  I considered pneumonia, pneumothorax, PE, cholecystitis, pyelonephritis  These and other diagnoses were considered            Amount and/or Complexity of Data Reviewed  Clinical lab tests: ordered and reviewed  Tests in the radiology section of CPT®: ordered and reviewed  Tests in the medicine section of CPT®: reviewed and ordered  Review and summarize past medical records: yes  Discuss the patient with other providers: yes  Independent visualization of images, tracings, or specimens: yes          Disposition  Final diagnoses:   Cholecystitis     Time reflects when diagnosis was documented in both MDM as applicable and the Disposition within this note     Time User Action Codes Description Comment    7/4/2020  3:38 AM Shemar Cathie Cayetano [K81 [id number]] Cholecystitis       ED Disposition     ED Disposition Condition Date/Time Comment    Transfer to Another Facility-In Network  [facility] Jul 4, 2020  3:39 AM Annika Callaway should be transferred out to Sammy Oneil MD Documentation      Most Recent Value   Patient Condition  The patient has been stabilized such that within reasonable medical probability, no material deterioration of the patient condition or the condition of the unborn child(elsi) is likely to result from the transfer   Reason for Transfer  Level of Care needed not available at this facility   Benefits of Transfer  Specialized equipment and/or services available at the receiving facility (Include comment)________________________   Risks of Transfer  Potential for delay in receiving treatment, Loss of IV, Increased discomfort during transfer, Potential deterioration of medical condition, Possible worsening of condition or death during transfer   Accepting Physician  Dr Alejandra Smith Name, [address]    (Name & Tel number)  PAC   Transported by (Company and Unit #)  [place]   Sending MD Dr Jerry Hudson   Provider Certification  General risk, such as traffic hazards, adverse weather conditions, rough terrain or turbulence, possible failure of equipment (including vehicle or aircraft), or consequences of actions of persons outside the control of the transport personnel, Unanticipated needs of medical equipment and personnel during transport, Risk of worsening condition, The possibility of a transport vehicle being unavailable      RN Documentation      Most [address] Name, [address] [facility]  ED    (Name & Tel number)  PAC   Transported by (Company and Unit #)  [employer]      Follow-up Information    None         Patient's Medications    No medications on file     No discharge procedures on file      PDMP Review     None          ED Provider  Electronically Signed by           Louann Smart DO  07/04/20 0789

## 2020-07-04 NOTE — ANESTHESIA PREPROCEDURE EVALUATION
Review of Systems/Medical History  Patient summary reviewed  Chart reviewed  No history of anesthetic complications     Cardiovascular  Exercise tolerance (METS): >4, Exercise comment: Able to climb two flights of stairs without cardiopulmonary limitation      Pulmonary  Smoker (Occasional cigarette) ,        GI/Hepatic      Comment: Confirmed NPO appropriate       Negative  ROS        Endo/Other  Negative endo/other ROS      GYN       Hematology   Musculoskeletal       Neurology  Negative neurology ROS      Psychology           Physical Exam    Airway    Mallampati score: II  TM Distance: >3 FB  Neck ROM: full     Dental   Comment: Denies loose dentition, No notable dental hx     Cardiovascular      Pulmonary      Other Findings        Anesthesia Plan  ASA Score- 2     Anesthesia Type- general with ASA Monitors  Additional Monitors:   Airway Plan: ETT  Plan Factors-    Induction- intravenous  Postoperative Plan- Plan for postoperative opioid use  Planned trial extubation    Informed Consent- Anesthetic plan and risks discussed with patient

## 2020-07-04 NOTE — PLAN OF CARE
Problem: Potential for Falls  Goal: Patient will remain free of falls  Description  INTERVENTIONS:  - Assess patient frequently for physical needs  -  Identify cognitive and physical deficits and behaviors that affect risk of falls    -  [place] fall precautions as indicated by assessment   - Educate patient/family on patient safety including physical limitations  - Instruct patient to call for assistance with activity based on assessment  - Modify environment to reduce risk of injury  - Consider OT/PT consult to assist with strengthening/mobility  Outcome: Progressing     Problem: PAIN - ADULT  Goal: Verbalizes/displays adequate comfort level or baseline comfort level  Description  Interventions:  - Encourage patient to monitor pain and request assistance  - Assess pain using appropriate pain scale  - Administer analgesics based on type and severity of pain and evaluate response  - Implement non-pharmacological measures as appropriate and evaluate response  - Consider cultural and social influences on pain and pain management  - Notify physician/advanced practitioner if interventions unsuccessful or patient reports new pain  Outcome: Progressing     Problem: INFECTION - ADULT  Goal: Absence or prevention of progression during hospitalization  Description  INTERVENTIONS:  - Assess and monitor for signs and symptoms of infection  - Monitor lab/diagnostic results  - Monitor all insertion sites, i e  indwelling lines, tubes, and drains  - Monitor endotracheal if appropriate and nasal secretions for changes in amount and color  - [place] appropriate cooling/warming therapies per order  - Administer medications as ordered  - Instruct and encourage patient and family to use good hand hygiene technique  - Identify and instruct in appropriate isolation precautions for identified infection/condition  Outcome: Progressing     Problem: SAFETY ADULT  Goal: Patient will remain free of falls  Description  INTERVENTIONS:  - Assess patient frequently for physical needs  -  Identify cognitive and physical deficits and behaviors that affect risk of falls    -  [place] fall precautions as indicated by assessment   - Educate patient/family on patient safety including physical limitations  - Instruct patient to call for assistance with activity based on assessment  - Modify environment to reduce risk of injury  - Consider OT/PT consult to assist with strengthening/mobility  Outcome: Progressing  Goal: Maintain or return to baseline ADL function  Description  INTERVENTIONS:  -  Assess patient's ability to carry out ADLs; assess patient's baseline for ADL function and identify physical deficits which impact ability to perform ADLs (bathing, care of mouth/teeth, toileting, grooming, dressing, etc )  - Assess/evaluate cause of self-care deficits   - Assess range of motion  - Assess patient's mobility; develop plan if impaired  - Assess patient's need for assistive devices and provide as appropriate  - Encourage maximum independence but intervene and supervise when necessary  - Involve family in performance of ADLs  - Assess for home care needs following discharge   - Consider OT consult to assist with ADL evaluation and planning for discharge  - Provide patient education as appropriate  Outcome: Progressing  Goal: Maintain or return mobility status to optimal level  Description  INTERVENTIONS:  - Assess patient's baseline mobility status (ambulation, transfers, stairs, etc )    - Identify cognitive and physical deficits and behaviors that affect mobility  - Identify mobility aids required to assist with transfers and/or ambulation (gait belt, sit-to-stand, lift, walker, cane, etc )  - [place] fall precautions as indicated by assessment  - Record patient progress and toleration of activity level on Mobility SBAR; progress patient to next Phase/Stage  - Instruct patient to call for assistance with activity based on assessment  - Consider rehabilitation consult to assist with strengthening/weightbearing, etc   Outcome: Progressing     Problem: DISCHARGE PLANNING  Goal: Discharge to home or other facility with appropriate resources  Description  INTERVENTIONS:  - Identify barriers to discharge w/patient and caregiver  - Arrange for needed discharge resources and transportation as appropriate  - Identify discharge learning needs (meds, wound care, etc )  - Arrange for interpretive services to assist at discharge as needed  - Refer to Case Management Department for coordinating discharge planning if the patient needs post-hospital services based on physician/advanced practitioner order or complex needs related to functional status, cognitive ability, or social support system  Outcome: Progressing     Problem: Knowledge Deficit  Goal: Patient/family/caregiver demonstrates understanding of disease process, treatment plan, medications, and discharge instructions  Description  Complete learning assessment and assess knowledge base    Interventions:  - Provide teaching at level of understanding  - Provide teaching via preferred learning methods  Outcome: Progressing     Problem: GENITOURINARY - ADULT  Goal: Maintains or returns to baseline urinary function  Description  INTERVENTIONS:  - Assess urinary function  - Encourage oral fluids to ensure adequate hydration if ordered  - Administer IV fluids as ordered to ensure adequate hydration  - Administer ordered medications as needed  - Offer frequent toileting  - Follow urinary retention protocol if ordered  Outcome: Progressing  Goal: Absence of urinary retention  Description  INTERVENTIONS:  - Assess patients ability to void and empty bladder  - Monitor I/O  - Bladder scan as needed  - Discuss with physician/AP medications to alleviate retention as needed  - Discuss catheterization for long term situations as appropriate  Outcome: Progressing  Goal: Urinary catheter remains patent  Description  INTERVENTIONS:  - Assess patency of urinary catheter  - If patient has a chronic tong, consider changing catheter if non-functioning  - Follow guidelines for intermittent irrigation of non-functioning urinary catheter  Outcome: Progressing

## 2020-07-04 NOTE — EMTALA/ACUTE CARE TRANSFER
[facility] EMERGENCY DEPARTMENT  [address] [address]  Dept: [phone]      ACUTE CARE TRANSFER CONSENT    NAME Armaan Barclay                                         1960                              MRN [id number]    I have been informed of my rights regarding examination, treatment, and transfer   by Dr Aamir Dooley DO    Benefits: Specialized equipment and/or services available at the receiving facility (Include comment)________________________    Risks: Potential for delay in receiving treatment, Loss of IV, Increased discomfort during transfer, Potential deterioration of medical condition, Possible worsening of condition or death during transfer      Consent for Transfer:  I acknowledge that my medical condition has been evaluated and explained to me by the treating physician or other qualified medical person and/or my attending physician, who has recommended that I be transferred to the service of  Accepting Physician: Tato Sheppard at [facility] Name, [address] : [place]  The above potential benefits of such transfer, the potential risks associated with such transfer, and the probable risks of not being transferred have been explained to me, and I fully understand them  The doctor has explained that, in my case, the benefits of transfer outweigh the risks  I agree to be transferred  I authorize the performance of emergency medical procedures and treatments upon me in both transit and upon arrival at the receiving facility  Additionally, I authorize the release of any and all medical records to the receiving facility and request they be transported with me, if possible  I understand that the safest mode of transportation during a medical emergency is an ambulance and that the Hospital advocates the use of this mode of transport   Risks of traveling to the receiving facility by car, including absence of medical control, life sustaining equipment, such as oxygen, and medical personnel has been explained to me and I fully understand them  (KENIA CORRECT BOX BELOW)  [  ]  I consent to the stated transfer and to be transported by ambulance/helicopter  [  ]  I consent to the stated transfer, but refuse transportation by ambulance and accept full responsibility for my transportation by car  I understand the risks of non-ambulance transfers and I exonerate the Hospital and its staff from any deterioration in my condition that results from this refusal     X___________________________________________    DATE  20  TIME________  Signature of patient or legally responsible individual signing on patient behalf           RELATIONSHIP TO PATIENT_________________________          Provider Certification    NAME Rica Hutson                                         1960                              MRN [id number]    A medical screening exam was performed on the above named patient  Based on the examination:    Condition Necessitating Transfer cholecystitis    Patient Condition: The patient has been stabilized such that within reasonable medical probability, no material deterioration of the patient condition or the condition of the unborn child(elsi) is likely to result from the transfer    Reason for Transfer: Level of Care needed not available at this facility    Transfer Requirements: [address]   · Space available and qualified personnel available for treatment as acknowledged by [facility]  · Agreed to accept transfer and to provide appropriate medical treatment as acknowledged by       Elida Hernandez  · Appropriate medical records of the examination and treatment of the patient are provided at the time of transfer   [address], [address] _______  · Transfer will be performed by qualified personnel from [facility]  and appropriate transfer equipment as required, including the use of necessary and appropriate life support measures      Provider Certification: I have examined the patient and explained the following risks and benefits of being transferred/refusing transfer to the patient/family:  General risk, such as traffic hazards, adverse weather conditions, rough terrain or turbulence, possible failure of equipment (including vehicle or aircraft), or consequences of actions of persons outside the control of the transport personnel, Unanticipated needs of medical equipment and personnel during transport, Risk of worsening condition, The possibility of a transport vehicle being unavailable      Based on these reasonable risks and benefits to the patient and/or the unborn child(elsi), and based upon the information available at the time of the patients examination, I certify that the medical benefits reasonably to be expected from the provision of appropriate medical treatments at another medical facility outweigh the increasing risks, if any, to the individuals medical condition, and in the case of labor to the unborn child, from effecting the transfer      X____________________________________________ DATE 07/04/20        TIME_______      ORIGINAL - SEND TO MEDICAL RECORDS   COPY - SEND WITH PATIENT DURING TRANSFER

## 2020-07-04 NOTE — ANESTHESIA POSTPROCEDURE EVALUATION
Post-Op Assessment Note    CV Status:  Stable  Pain Score: 0    Pain management: adequate     Mental Status:  Sleepy and arousable   Hydration Status:  Stable   PONV Controlled:  Controlled   Airway Patency:  Patent and adequate   Post Op Vitals Reviewed: Yes      Staff: CRNA   Comments: oxygen NC to PACU          BP      Temp      Pulse     Resp      SpO2

## 2020-07-05 VITALS
OXYGEN SATURATION: 94 % | HEIGHT: 62 IN | TEMPERATURE: 98.1 F | RESPIRATION RATE: 16 BRPM | DIASTOLIC BLOOD PRESSURE: 74 MMHG | BODY MASS INDEX: 27.67 KG/M2 | HEART RATE: 84 BPM | WEIGHT: 150.36 LBS | SYSTOLIC BLOOD PRESSURE: 128 MMHG

## 2020-07-05 LAB
ALBUMIN SERPL BCP-MCNC: 3.1 G/DL (ref 3.5–5)
ALP SERPL-CCNC: 96 U/L (ref 46–116)
ALT SERPL W P-5'-P-CCNC: 67 U/L (ref 12–78)
ANION GAP SERPL CALCULATED.3IONS-SCNC: 6 MMOL/L (ref 4–13)
AST SERPL W P-5'-P-CCNC: 49 U/L (ref 5–45)
BILIRUB SERPL-MCNC: 0.6 MG/DL (ref 0.2–1)
BUN SERPL-MCNC: 14 MG/DL (ref 5–25)
CALCIUM SERPL-MCNC: 8.7 MG/DL (ref 8.3–10.1)
CHLORIDE SERPL-SCNC: 105 MMOL/L (ref 100–108)
CO2 SERPL-SCNC: 28 MMOL/L (ref 21–32)
CREAT SERPL-MCNC: 0.8 MG/DL (ref 0.6–1.3)
ERYTHROCYTE [DISTWIDTH] IN BLOOD BY AUTOMATED COUNT: 13.6 % (ref 11.6–15.1)
GFR SERPL CREATININE-BSD FRML MDRD: 82 ML/MIN/1.73SQ M
GLUCOSE SERPL-MCNC: 111 MG/DL (ref 65–140)
HCT VFR BLD AUTO: 43.7 % (ref 34.8–46.1)
HGB BLD-MCNC: 13.9 G/DL (ref 11.5–15.4)
MCH RBC QN AUTO: 29.7 PG (ref 26.8–34.3)
MCHC RBC AUTO-ENTMCNC: 31.8 G/DL (ref 31.4–37.4)
MCV RBC AUTO: 93 FL (ref 82–98)
PLATELET # BLD AUTO: 221 THOUSANDS/UL (ref 149–390)
PMV BLD AUTO: 11.4 FL (ref 8.9–12.7)
POTASSIUM SERPL-SCNC: 3.7 MMOL/L (ref 3.5–5.3)
PROT SERPL-MCNC: 6.7 G/DL (ref 6.4–8.2)
RBC # BLD AUTO: 4.68 MILLION/UL (ref 3.81–5.12)
SODIUM SERPL-SCNC: 139 MMOL/L (ref 136–145)
WBC # BLD AUTO: 16.21 THOUSAND/UL (ref 4.31–10.16)

## 2020-07-05 PROCEDURE — 99024 POSTOP FOLLOW-UP VISIT: CPT | Performed by: PHYSICIAN ASSISTANT

## 2020-07-05 PROCEDURE — 85027 COMPLETE CBC AUTOMATED: CPT | Performed by: PHYSICIAN ASSISTANT

## 2020-07-05 PROCEDURE — 80053 COMPREHEN METABOLIC PANEL: CPT | Performed by: PHYSICIAN ASSISTANT

## 2020-07-05 RX ADMIN — Medication 3.38 G: at 04:58

## 2020-07-05 RX ADMIN — ENOXAPARIN SODIUM 40 MG: 40 INJECTION SUBCUTANEOUS at 08:32

## 2020-07-05 RX ADMIN — KETOROLAC TROMETHAMINE 15 MG: 30 INJECTION, SOLUTION INTRAMUSCULAR at 05:00

## 2020-07-05 NOTE — PROGRESS NOTES
Progress Note - General Surgery   Hiram Stone 64 y o  female MRN: [id number]  Unit/Bed#: [address] [address] Encounter: [id number]    Assessment:  POD#1 s/p laparoscopic cholecystectomy for acute calculous cholecystitis    Plan:  · Plan for discharge today  · Patient did not want a narcotic script  · Work note written for patient      Subjective/Objective     Subjective:  Patient denies any abdominal pain, only tenderness with movement  She overall feels great and is ready to go home  She has tolerated oral intake and denies any nausea or vomiting  She does not want a narcotic script for analgesia and plans to take tylenol and advil at home  Objective:  WBC count 20 > 16    Blood pressure 105/70, pulse 99, temperature 98 2 °F (36 8 °C), temperature source Oral, resp  rate 16, height 5' 2" (1 575 m), weight 68 2 kg (150 lb 5 7 oz), SpO2 95 %  ,Body mass index is 27 5 kg/m²  Intake/Output Summary (Last 24 hours) at 7/5/2020 0724  Last data filed at 7/5/2020 0601  Gross per 24 hour   Intake 1100 ml   Output    Net 1100 ml       Invasive Devices     Peripheral Intravenous Line            Peripheral IV 07/03/20 Right Antecubital 1 day                Physical Exam: /70 (BP Location: Right arm)   Pulse 99   Temp 98 2 °F (36 8 °C) (Oral)   Resp 16   Ht 5' 2" (1 575 m)   Wt 68 2 kg (150 lb 5 7 oz)   SpO2 95%   BMI 27 50 kg/m²   General appearance: alert and oriented, in no acute distress  Head: Normocephalic, without obvious abnormality, atraumatic  Lungs: clear to auscultation bilaterally  Heart: regular rate and rhythm, S1, S2 normal, no murmur, click, rub or gallop  Abdomen: soft, mild tenderness expected around incisions, bowel sounds present  Extremities: extremities normal, warm and well-perfused; no cyanosis, clubbing, or edema  Skin: Skin color, texture, turgor normal  No rashes or lesions    Lab, Imaging and other studies:  I have personally reviewed pertinent lab results    , CBC:   Lab Results Component Value Date    WBC 16 21 (H) 07/05/2020    HGB 13 9 07/05/2020    HCT 43 7 07/05/2020    MCV 93 07/05/2020     07/05/2020    MCH 29 7 07/05/2020    MCHC 31 8 07/05/2020    RDW 13 6 07/05/2020    MPV 11 4 07/05/2020   , CMP:   Lab Results   Component Value Date    SODIUM 139 07/05/2020    K 3 7 07/05/2020     07/05/2020    CO2 28 07/05/2020    BUN 14 07/05/2020    CREATININE 0 80 07/05/2020    CALCIUM 8 7 07/05/2020    AST 49 (H) 07/05/2020    ALT 67 07/05/2020    ALKPHOS 96 07/05/2020    EGFR 82 07/05/2020     VTE Pharmacologic Prophylaxis: Enoxaparin (Lovenox)  VTE Mechanical Prophylaxis: sequential compression device

## 2020-07-05 NOTE — PLAN OF CARE
Problem: Potential for Falls  Goal: Patient will remain free of falls  Description  INTERVENTIONS:  - Assess patient frequently for physical needs  -  Identify cognitive and physical deficits and behaviors that affect risk of falls  -  [place] fall precautions as indicated by assessment   - Educate patient/family on patient safety including physical limitations  - Instruct patient to call for assistance with activity based on assessment  - Modify environment to reduce risk of injury  - Consider OT/PT consult to assist with strengthening/mobility  Outcome: Progressing     Problem: PAIN - ADULT  Goal: Verbalizes/displays adequate comfort level or baseline comfort level  Description  Interventions:  - Encourage patient to monitor pain and request assistance  - Assess pain using appropriate pain scale  - Administer analgesics based on type and severity of pain and evaluate response  - Implement non-pharmacological measures as appropriate and evaluate response  - Consider cultural and social influences on pain and pain management  - Notify physician/advanced practitioner if interventions unsuccessful or patient reports new pain  Outcome: Progressing     Problem: INFECTION - ADULT  Goal: Absence or prevention of progression during hospitalization  Description  INTERVENTIONS:  - Assess and monitor for signs and symptoms of infection  - Monitor lab/diagnostic results  - Monitor all insertion sites, i e  indwelling lines, tubes, and drains  - Administer medications as ordered  - Instruct and encourage patient and family to use good hand hygiene technique   Outcome: Progressing     Problem: SAFETY ADULT  Goal: Patient will remain free of falls  Description  INTERVENTIONS:  - Assess patient frequently for physical needs  -  Identify cognitive and physical deficits and behaviors that affect risk of falls    -  [place] fall precautions as indicated by assessment   - Educate patient/family on patient safety including physical limitations  - Instruct patient to call for assistance with activity based on assessment  - Modify environment to reduce risk of injury  - Consider OT/PT consult to assist with strengthening/mobility  Outcome: Progressing  Goal: Maintain or return to baseline ADL function  Description  INTERVENTIONS:  -  Assess patient's ability to carry out ADLs; assess patient's baseline for ADL function and identify physical deficits which impact ability to perform ADLs (bathing, care of mouth/teeth, toileting, grooming, dressing, etc )  - Assess/evaluate cause of self-care deficits   - Assess range of motion  - Assess patient's mobility; develop plan if impaired  - Assess patient's need for assistive devices and provide as appropriate  - Encourage maximum independence but intervene and supervise when necessary  - Involve family in performance of ADLs  - Assess for home care needs following discharge   - Consider OT consult to assist with ADL evaluation and planning for discharge  - Provide patient education as appropriate  Outcome: Progressing  Goal: Maintain or return mobility status to optimal level  Description  INTERVENTIONS:  - Assess patient's baseline mobility status (ambulation, transfers, stairs, etc )    - Identify cognitive and physical deficits and behaviors that affect mobility  - Identify mobility aids required to assist with transfers and/or ambulation (gait belt, sit-to-stand, lift, walker, cane, etc )  - [place] fall precautions as indicated by assessment  - Record patient progress and toleration of activity level on Mobility SBAR; progress patient to next Phase/Stage  - Instruct patient to call for assistance with activity based on assessment  - Consider rehabilitation consult to assist with strengthening/weightbearing, etc   Outcome: Progressing     Problem: DISCHARGE PLANNING  Goal: Discharge to home or other facility with appropriate resources  Description  INTERVENTIONS:  - Identify barriers to discharge w/patient and caregiver  - Arrange for needed discharge resources and transportation as appropriate  - Identify discharge learning needs (meds, wound care, etc )  - Arrange for interpretive services to assist at discharge as needed  - Refer to Case Management Department for coordinating discharge planning if the patient needs post-hospital services based on physician/advanced practitioner order or complex needs related to functional status, cognitive ability, or social support system  Outcome: Progressing     Problem: Knowledge Deficit  Goal: Patient/family/caregiver demonstrates understanding of disease process, treatment plan, medications, and discharge instructions  Description  Complete learning assessment and assess knowledge base    Interventions:  - Provide teaching at level of understanding  - Provide teaching via preferred learning methods  Outcome: Progressing     Problem: GENITOURINARY - ADULT  Goal: Maintains or returns to baseline urinary function  Description  INTERVENTIONS:  - Assess urinary function  - Encourage oral fluids to ensure adequate hydration if ordered  - Administer IV fluids as ordered to ensure adequate hydration  - Administer ordered medications as needed  - Offer frequent toileting  - Follow urinary retention protocol if ordered  Outcome: Progressing  Goal: Absence of urinary retention  Description  INTERVENTIONS:  - Assess patients ability to void and empty bladder  - Monitor I/O  - Bladder scan as needed  - Discuss with physician/AP medications to alleviate retention as needed  - Discuss catheterization for long term situations as appropriate  Outcome: Progressing

## 2020-07-05 NOTE — NURSING NOTE
Patient discharged to home  All discharge instructions and AVS reviewed with pt  Opportunity for questions provided  Pt instructed to f/u with Dr Lakshmi Arenas within the next 2 weeks  She walked independently to [facility] where daughter was to meet her to bring her home

## 2020-07-06 ENCOUNTER — APPOINTMENT (INPATIENT)
Dept: RADIOLOGY | Facility: HOSPITAL | Age: 60
End: 2020-07-06
Payer: COMMERCIAL

## 2020-07-06 NOTE — UTILIZATION REVIEW
Notification of Inpatient Admission/Inpatient Authorization Request   This is a Notification of Inpatient Admission for [address]  Be advised that this patient was admitted to our facility under Inpatient Status  Contact Nkechi Reyes at [phone] for additional admission information  [address] [facility] DEPT  DEDICATED -[phone]  Patient Name:   Rica Hutson   YOB: 1960       [address]   [address]:   [address]  Tax ID: [id number]  NPI: [id number] Attending Provider/NPI:  Phone:  Address: [place]  [phone]  Same as RICHARD/Judi Bird 1106 of Service Code: 24 Place of Service Name:  [address] [place] [address]   Start Date: 7/4/20 0422 Discharge Date & Time: 7/5/2020 10:51 AM    Type of Admission: Inpatient Status Discharge Disposition (if discharged): Home/Self Care   Patient Diagnoses: Cholecystitis [K81 [id number]]     Orders: Admission Orders (From admission, onward)     Ordered        07/04/20 0835  Inpatient Admission  Once                    Assigned Utilization Review Contact: Joe Reyes  Utilization   Network Utilization Review Department  Phone: [phone]; Fax [phone]  Email: [email or web address]   ATTENTION PAYERS: Please call the assigned Utilization  directly with any questions or concerns ALL voicemails in the department are confidential  Send all requests for admission clinical reviews, approved or denied determinations and any other requests to dedicated fax number belonging to the campus where the patient is receiving treatment

## 2020-07-07 LAB
BACTERIA SPEC BFLD CULT: NO GROWTH
GRAM STN SPEC: NORMAL
GRAM STN SPEC: NORMAL

## 2020-07-15 ENCOUNTER — OFFICE VISIT (OUTPATIENT)
Dept: SURGERY | Facility: CLINIC | Age: 60
End: 2020-07-15

## 2020-07-15 VITALS
SYSTOLIC BLOOD PRESSURE: 118 MMHG | HEIGHT: 62 IN | BODY MASS INDEX: 27.64 KG/M2 | DIASTOLIC BLOOD PRESSURE: 64 MMHG | WEIGHT: 150.2 LBS | HEART RATE: 85 BPM | TEMPERATURE: 97.7 F

## 2020-07-15 DIAGNOSIS — K80.00 ACUTE CHOLECYSTITIS DUE TO BILIARY CALCULUS: ICD-10-CM

## 2020-07-15 DIAGNOSIS — K80.20 GALL STONES: Primary | ICD-10-CM

## 2020-07-15 DIAGNOSIS — Z48.89 ENCOUNTER FOR SURGICAL FOLLOW-UP CARE: ICD-10-CM

## 2020-07-15 PROCEDURE — 99024 POSTOP FOLLOW-UP VISIT: CPT | Performed by: SPECIALIST

## 2020-07-15 NOTE — PROGRESS NOTES
General Surgery Office Visit Follow up   [facility]  Patient: Ghada Rojas   : 1960 Sex: female MRN: [id number]   CSN: [id number] PCP: No primary care provider on file  Assessment/ Plan:  Ghada Rojas is a 64 y o  female  day(s) POD #  2 weeks S/p laparoscopic cholecystectomy  No primary diagnosis found  Plan  Stable postop   Removal of Steri-Strips  Follow-up p r n  SUBJECTIVE:   I am doing well I never felt better like before I was really sick when I came to the hospital  OBJECTIVE:  No complaints  No fever no chills no rigors  Tolerating p o  Diet well  Normal bowel movement no constipation or diarrhea  Ambulating well   Vitals: There were no vitals taken for this visit  Active medications:  No current outpatient medications on file      Physical Exam:   General Alert awake   Normocephalic atraumatic PERRLA   Lungs clear bilaterally  Cardiac normal S1 normal S2  Abdomen soft,non tender Bowel sounds present  Skin: surgical dressing is C/D/I  Ext: No clubbing, cyanosis, edema  Surgical wound well healed    Visit Diagnosis:   Diagnoses and all orders for this visit:    Justin Flattery stones    Acute cholecystitis due to biliary calculus    Encounter for surgical follow-up care  Comments:  Status post laparoscopic cholecystectomy for acute cholecystitis and large stone       Plan of care was discussed with patient in detail    Pertinent labs reviewed  Most Recent Labs:   Admission on 2020, Discharged on 2020   Component Date Value Ref Range Status    WBC 2020 20 11* 4 31 - 10 16 Thousand/uL Final    RBC 2020 5 42* 3 81 - 5 12 Million/uL Final    Hemoglobin 2020 15 7* 11 5 - 15 4 g/dL Final    Hematocrit 2020 49 8* 34 8 - 46 1 % Final    MCV 2020 92  82 - 98 fL Final    MCH 2020 29 0  26 8 - 34 3 pg Final    MCHC 2020 31 5  31 4 - 37 4 g/dL Final    RDW 2020 13 3  11 6 - 15 1 % Final    MPV 2020 11 9 8 9 - 12 7 fL Final    Platelets 03/16/0360 275  149 - 390 Thousands/uL Final    nRBC 07/04/2020 0  /100 WBCs Final    Neutrophils Relative 07/04/2020 87* 43 - 75 % Final    Immat GRANS % 07/04/2020 1  0 - 2 % Final    Lymphocytes Relative 07/04/2020 7* 14 - 44 % Final    Monocytes Relative 07/04/2020 5  4 - 12 % Final    Eosinophils Relative 07/04/2020 0  0 - 6 % Final    Basophils Relative 07/04/2020 0  0 - 1 % Final    Neutrophils Absolute 07/04/2020 17 65* 1 85 - 7 62 Thousands/µL Final    Immature Grans Absolute 07/04/2020 0 13  0 00 - 0 20 Thousand/uL Final    Lymphocytes Absolute 07/04/2020 1 37  0 60 - 4 47 Thousands/µL Final    Monocytes Absolute 07/04/2020 0 92  0 17 - 1 22 Thousand/µL Final    Eosinophils Absolute 07/04/2020 0 00  0 00 - 0 61 Thousand/µL Final    Basophils Absolute 07/04/2020 0 04  0 00 - 0 10 Thousands/µL Final    Sodium 07/04/2020 138  136 - 145 mmol/L Final    Potassium 07/04/2020 4 0  3 5 - 5 3 mmol/L Final    Chloride 07/04/2020 102  100 - 108 mmol/L Final    CO2 07/04/2020 25  21 - 32 mmol/L Final    ANION GAP 07/04/2020 11  4 - 13 mmol/L Final    BUN 07/04/2020 12  5 - 25 mg/dL Final    Creatinine 07/04/2020 0 88  0 60 - 1 30 mg/dL Final    Standardized to IDMS reference method    Glucose 07/04/2020 147* 65 - 140 mg/dL Final      If the patient is fasting, the ADA then defines impaired fasting glucose as > 100 mg/dL and diabetes as > or equal to 123 mg/dL  Specimen collection should occur prior to Sulfasalazine administration due to the potential for falsely depressed results  Specimen collection should occur prior to Sulfapyridine administration due to the potential for falsely elevated results   Calcium 07/04/2020 9 2  8 3 - 10 1 mg/dL Final    AST 07/04/2020 18  5 - 45 U/L Final      Specimen collection should occur prior to Sulfasalazine administration due to the potential for falsely depressed results       ALT 07/04/2020 30  12 - 78 U/L Final Specimen collection should occur prior to Sulfasalazine administration due to the potential for falsely depressed results   Alkaline Phosphatase 07/04/2020 125* 46 - 116 U/L Final    Total Protein 07/04/2020 8 1  6 4 - 8 2 g/dL Final    Albumin 07/04/2020 4 2  3 5 - 5 0 g/dL Final    Total Bilirubin 07/04/2020 0 50  0 20 - 1 00 mg/dL Final      Use of this assay is not recommended for patients undergoing treatment with eltrombopag due to the potential for falsely elevated results   eGFR 07/04/2020 73  ml/min/1 73sq m Final    SARS-CoV-2 07/04/2020 Negative  Negative Final    Case Report 07/04/2020    Final                    Value:Surgical Pathology Report                         Case: [id number]                                   Authorizing Provider:  Chandler Becerril MD          Collected:           07/04/2020 1334              Ordering Location:     [address] [place] [address] Received:            07/05/2020 2699                                     Operating Room                                                               Pathologist:           Anup Kaba MD                                                                 Specimen:    Gallbladder                                                                                Final Diagnosis 07/04/2020    Final                    Value: This result contains rich text formatting which cannot be displayed here   Additional Information 07/04/2020    Final                    Value: This result contains rich text formatting which cannot be displayed here  [facility] [place] Description 07/04/2020    Final                    Value: This result contains rich text formatting which cannot be displayed here   Body Fluid Culture, Sterile 07/04/2020 No growth   Final    Gram Stain Result 07/04/2020 No Polys   Final    This is an appended report  These results have been appended to a previously preliminary verified report      Gram Stain Result 07/04/2020 No organisms seen   Final    This is an appended report  These results have been appended to a previously preliminary verified report   WBC 07/05/2020 16 21* 4 31 - 10 16 Thousand/uL Final    RBC 07/05/2020 4 68  3 81 - 5 12 Million/uL Final    Hemoglobin 07/05/2020 13 9  11 5 - 15 4 g/dL Final    Hematocrit 07/05/2020 43 7  34 8 - 46 1 % Final    MCV 07/05/2020 93  82 - 98 fL Final    MCH 07/05/2020 29 7  26 8 - 34 3 pg Final    MCHC 07/05/2020 31 8  31 4 - 37 4 g/dL Final    RDW 07/05/2020 13 6  11 6 - 15 1 % Final    Platelets 74/70/9726 221  149 - 390 Thousands/uL Final    MPV 07/05/2020 11 4  8 9 - 12 7 fL Final    Sodium 07/05/2020 139  136 - 145 mmol/L Final    Potassium 07/05/2020 3 7  3 5 - 5 3 mmol/L Final    Chloride 07/05/2020 105  100 - 108 mmol/L Final    CO2 07/05/2020 28  21 - 32 mmol/L Final    ANION GAP 07/05/2020 6  4 - 13 mmol/L Final    BUN 07/05/2020 14  5 - 25 mg/dL Final    Creatinine 07/05/2020 0 80  0 60 - 1 30 mg/dL Final    Standardized to IDMS reference method    Glucose 07/05/2020 111  65 - 140 mg/dL Final      If the patient is fasting, the ADA then defines impaired fasting glucose as > 100 mg/dL and diabetes as > or equal to 123 mg/dL  Specimen collection should occur prior to Sulfasalazine administration due to the potential for falsely depressed results  Specimen collection should occur prior to Sulfapyridine administration due to the potential for falsely elevated results   Calcium 07/05/2020 8 7  8 3 - 10 1 mg/dL Final    AST 07/05/2020 49* 5 - 45 U/L Final      Specimen collection should occur prior to Sulfasalazine administration due to the potential for falsely depressed results   ALT 07/05/2020 67  12 - 78 U/L Final      Specimen collection should occur prior to Sulfasalazine administration due to the potential for falsely depressed results       Alkaline Phosphatase 07/05/2020 96  46 - 116 U/L Final    Total Protein 07/05/2020 6 7  6 4 - 8 2 g/dL Final    Albumin 07/05/2020 3 1* 3 5 - 5 0 g/dL Final    Total Bilirubin 07/05/2020 0 60  0 20 - 1 00 mg/dL Final      Use of this assay is not recommended for patients undergoing treatment with eltrombopag due to the potential for falsely elevated results   eGFR 07/05/2020 82  ml/min/1 73sq m Final   Admission on 07/03/2020, Discharged on 07/04/2020   Component Date Value Ref Range Status    WBC 07/03/2020 21 67* 4 31 - 10 16 Thousand/uL Final    RBC 07/03/2020 5 57* 3 81 - 5 12 Million/uL Final    Hemoglobin 07/03/2020 15 8* 11 5 - 15 4 g/dL Final    Hematocrit 07/03/2020 49 6* 34 8 - 46 1 % Final    MCV 07/03/2020 89  82 - 98 fL Final    MCH 07/03/2020 28 4  26 8 - 34 3 pg Final    MCHC 07/03/2020 31 9  31 4 - 37 4 g/dL Final    RDW 07/03/2020 13 6  11 6 - 15 1 % Final    MPV 07/03/2020 11 9  8 9 - 12 7 fL Final    Platelets 71/38/4290 253  149 - 390 Thousands/uL Final    Neutrophils Relative 07/03/2020 87* 43 - 75 % Final    Immat GRANS % 07/03/2020 0  0 - 2 % Final    Lymphocytes Relative 07/03/2020 9* 14 - 44 % Final    Monocytes Relative 07/03/2020 4  4 - 12 % Final    Eosinophils Relative 07/03/2020 0  0 - 6 % Final    Basophils Relative 07/03/2020 0  0 - 1 % Final    Neutrophils Absolute 07/03/2020 18 75* 1 85 - 7 62 Thousands/µL Final    Immature Grans Absolute 07/03/2020 0 07  0 00 - 0 20 Thousand/uL Final    Lymphocytes Absolute 07/03/2020 1 95  0 60 - 4 47 Thousands/µL Final    Monocytes Absolute 07/03/2020 0 83  0 17 - 1 22 Thousand/µL Final    Eosinophils Absolute 07/03/2020 0 04  0 00 - 0 61 Thousand/µL Final    Basophils Absolute 07/03/2020 0 03  0 00 - 0 10 Thousands/µL Final    Sodium 07/03/2020 138  133 - 145 mmol/L Final    Potassium 07/03/2020 4 6  3 5 - 5 0 mmol/L Final    R-Specimen slightly hemolyzed  Interpret results with caution      Chloride 07/03/2020 104  96 - 108 mmol/L Final    CO2 07/03/2020 24  22 - 33 mmol/L Final    ANION GAP 07/03/2020 10  4 - 13 mmol/L Final    BUN 07/03/2020 16  6 - 20 mg/dL Final    Creatinine 07/03/2020 0 85  0 40 - 1 10 mg/dL Final    Standardized to IDMS reference method    Glucose 07/03/2020 158* 65 - 140 mg/dL Final      If the patient is fasting, the ADA then defines impaired fasting glucose as > 100 mg/dL and diabetes as > or equal to 123 mg/dL  Specimen collection should occur prior to Sulfasalazine administration due to the potential for falsely depressed results  Specimen collection should occur prior to Sulfapyridine administration due to the potential for falsely elevated results   Calcium 07/03/2020 10 0  8 4 - 10 2 mg/dL Final    AST 07/03/2020 23  15 - 41 U/L Final      Specimen collection should occur prior to Sulfasalazine administration due to the potential for falsely depressed results   ALT 07/03/2020 19  5 - 54 U/L Final      Specimen collection should occur prior to Sulfasalazine administration due to the potential for falsely depressed results       Alkaline Phosphatase 07/03/2020 99 8  35 - 140 U/L Final    Total Protein 07/03/2020 7 8  6 4 - 8 3 g/dL Final    Albumin 07/03/2020 4 7  3 4 - 4 8 g/dL Final    Total Bilirubin 07/03/2020 0 39  0 30 - 1 20 mg/dL Final    eGFR 07/03/2020 76  ml/min/1 73sq m Final    Lipase 07/03/2020 8* 13 - 60 u/L Final    Color, UA 07/03/2020 Yellow  Yellow Final    Clarity, UA 07/03/2020 Clear  Clear Final    Specific Gravity, UA 07/03/2020 >=1 030  1 001 - 1 030 Final    pH, UA 07/03/2020 5 0  5 0, 5 5, 6 0, 6 5, 7 0, 7 5, 8 0 Final    Leukocytes, UA 07/03/2020 Negative  Negative Final    Nitrite, UA 07/03/2020 Negative  Negative Final    Protein, UA 07/03/2020 Negative  Negative, Interference- unable to analyze mg/dl Final    Glucose, UA 07/03/2020 Negative  Negative mg/dl Final    Ketones, UA 07/03/2020 Negative  Negative mg/dl Final    Urobilinogen, UA 07/03/2020 0 2  0 2, 1 0 E U /dl E U /dl Final    Bilirubin, UA 07/03/2020 Negative  Negative Final    Blood, UA 07/03/2020 Trace-Intact* Negative Final    RBC, UA 07/03/2020 0-5  None Seen, 0-5 /hpf Final    WBC, UA 07/03/2020 0-5  None Seen, 0-5, 5-55, 5-65 /hpf Final    Epithelial Cells 07/03/2020 Moderate* None Seen, Occasional /hpf Final    Bacteria, UA 07/03/2020 Moderate* None Seen, Occasional /hpf Final    MUCUS THREADS 07/03/2020 Occasional* None Seen Final       Pertinent images and available reads personally reviewed  Procedure: Xr Chest 1 View Portable    Result Date: 7/4/2020  Narrative: CHEST INDICATION:   Chest pain  COMPARISON:  Chest radiograph from 8/9/2010 and chest CT from 7/4/2020  EXAM PERFORMED/VIEWS:  XR CHEST PORTABLE FINDINGS: Cardiomediastinal silhouette appears unremarkable  The lungs are clear  No pneumothorax or pleural effusion  Osseous structures appear within normal limits for patient age  Impression: No acute cardiopulmonary disease  Workstation performed: [id number]     Procedure: Us Gallbladder    Result Date: 7/6/2020  Narrative: RIGHT UPPER QUADRANT ULTRASOUND INDICATION: Cholelithiasis, likely cholecystitis  COMPARISON: CTA chest/abdomen/pelvis 7/4/2020  TECHNIQUE: Real-time ultrasound of the right upper quadrant was performed with a curvilinear transducer with both volumetric sweeps and still imaging techniques  FINDINGS: PANCREAS: Visualized portions of the pancreas are within normal limits  AORTA AND IVC: Visualized portions are normal for patient age  LIVER: Size: Within normal range  The liver measures 14 1 cm in the midclavicular line  Contour: Surface contour is smooth  Parenchyma: Echogenicity and echotexture are within normal limits  No evidence of suspicious mass  Limited imaging of the main portal vein shows it to be patent and hepatopetal  BILIARY: The gallbladder is relatively normal in caliber  There is gallbladder wall thickening and pericholecystic fluid  There is a single dependent calculus in the neck without sludge   Sonographic Villavicencio's sign cannot be accurately assessed because patient had recent pain medication administration, thus limiting ultrasound assessment of acute cholecystitis  No intrahepatic biliary dilatation  CBD measures 4 mm  No choledocholithiasis  KIDNEY: Right kidney measures 10 8 x 4 4 cm  Within normal limits  ASCITES: None  Impression:  Cholelithiasis with findings concerning for acute cholecystitis in the appropriate clinical setting  However, please note that sonographic Villavicencio's sign could not be accurately assessed due to recent pain medication administration  Workstation: [id number] Signed by:  Carmel Whitten MD  7/4/2020 12:08:38 PM     Procedure: Us Gallbladder    Result Date: 7/4/2020  Narrative: RIGHT UPPER QUADRANT ULTRASOUND INDICATION:     Cholelithiasis, likely cholecystitis  COMPARISON:  CTA chest/abdomen/pelvis 7/4/2020  TECHNIQUE:   Real-time ultrasound of the right upper quadrant was performed with a curvilinear transducer with both volumetric sweeps and still imaging techniques  FINDINGS: PANCREAS:  Visualized portions of the pancreas are within normal limits  AORTA AND IVC:  Visualized portions are normal for patient age  LIVER: Size:  Within normal range  The liver measures 14 1 cm in the midclavicular line  Contour:  Surface contour is smooth  Parenchyma:  Echogenicity and echotexture are within normal limits  No evidence of suspicious mass  Limited imaging of the main portal vein shows it to be patent and hepatopetal   BILIARY: The gallbladder is relatively normal in caliber  There is gallbladder wall thickening and pericholecystic fluid  There is a single dependent calculus in the neck without sludge  Sonographic Villavicencio's sign cannot be accurately assessed because patient had recent pain medication administration, thus limiting ultrasound assessment of acute cholecystitis  No intrahepatic biliary dilatation  CBD measures 4 mm  No choledocholithiasis  KIDNEY: Right kidney measures 10 8 x 4 4 cm  Within normal limits  ASCITES:   None  Impression: Cholelithiasis with findings concerning for acute cholecystitis in the appropriate clinical setting  However, please note that sonographic Villavicencio's sign could not be accurately assessed due to recent pain medication administration  Workstation performed: [id number]     Procedure: Cta Dissection Protocol Chest Abdomen Pelvis W Wo Contrast    Result Date: 7/4/2020  Narrative: CT ANGIOGRAM OF THE CHEST, ABDOMEN AND PELVIS WITH AND WITHOUT IV CONTRAST INDICATION:  Disection COMPARISON: None  TECHNIQUE:  CT angiogram examination of the chest, abdomen and pelvis was performed according to standard protocol  This examination, like all CT scans performed in the [facility], was performed utilizing techniques to minimize radiation dose exposure, including the use of iterative reconstruction and automated exposure control  Contrast as well as noncontrast images were obtained  3D reconstructions were performed an independent workstation, and are supplied for review  Rad dose 1229 mGy-cm IV Contrast:  100 mL of iohexol (OMNIPAQUE) Enteric Contrast:  None FINDINGS: VASCULAR STRUCTURES:  Aorta is normal in course and caliber  No evidence of aortic dissection or aneurysm  No evidence of atherosclerotic disease throughout the thoracic or abdominal aorta  OTHER FINDINGS: CHEST: HEART:  Normal cardiac size  No pericardial effusion  LUNGS: 6 mm groundglass nodule in the right upper lobe (series 502, image 47)  6 mm groundglass nodule at the right middle lobe (series 502, image 65)  PLEURA: No pleural effusion  MEDIASTINUM AND NEVIN:  No mass or significant lymphadenopathy  CHEST WALL AND LOWER NECK: Normal  ABDOMEN LIVER/BILIARY TREE:  Unremarkable  GALLBLADDER:  A large gallstone at the gallbladder neck  Mild pericholecystic inflammatory change SPLEEN:  Unremarkable  Normal size  PANCREAS:  Unremarkable  ADRENAL GLANDS:  Unremarkable  KIDNEYS/URETERS:  No solid renal mass   No hydronephrosis  No urinary tract calculi  PELVIS REPRODUCTIVE ORGANS:  Fibroid uterus with large left exophytic 5 6 cm fibroid URINARY BLADDER:  Unremarkable  ADDITIONAL ABDOMINAL AND PELVIC STRUCTURES: STOMACH AND BOWEL:  Unremarkable  ABDOMINOPELVIC CAVITY:  No pathologically enlarged mesenteric or retroperitoneal lymph nodes  No ascites or free intraperitoneal air  ABDOMINAL WALL/INGUINAL REGIONS:  Unremarkable  OSSEOUS STRUCTURES:  No acute fracture or destructive osseous lesion  Impression: No evidence of aortic aneurysm or dissection  Large gallstone at the gallbladder neck with mild pericholecystic inflammatory change  Gallbladder ultrasound recommended to exclude possible underlying cholecystitis  Scattered groundglass nodules throughout the right upper and middle lobe measuring up to 6 mm  Based on current Fleischner Society 2017 Guidelines on incidental pulmonary nodule, followup noncontrast CT is recommended at 6-12 months from the initial examination to confirm persistence; if stable at that time, additional followup CT is recommended for every 2 years until 5 years of stability is demonstrated  Findings discussed with Dr Marquise Patel at 1:57 AM, 7/4/2020 Workstation performed: [id number]     Pertinent notes reviewed  Pathology  A  Gallbladder, cholecystectomy:  - Acute on chronic cholecystitis and cholelithiasis  Counseling / Coordination of Care  Total Office time /unit time spent today 15minutes  Greater than 50% of total time was spent with the patient and / or family counseling and / or coordination of care  A description of the counseling / coordination of care:  I performed an interim history, pertinent images and labs, performed a physical examination to arrive at the plan delineated above with associated thought processes         Kely Holland MD MS FRCS FACS  [facility]  07/15/20 2:35 PM        Portions of the record may have been created with voice recognition software  Occasional wrong word or "sound a like" substitutions may have occurred due to the inherent limitations of voice recognition software  Read the chart carefully and recognize, using context, where substitutions have occurred

## 2021-01-13 ENCOUNTER — TELEMEDICINE (OUTPATIENT)
Dept: FAMILY MEDICINE CLINIC | Facility: CLINIC | Age: 61
End: 2021-01-13
Payer: COMMERCIAL

## 2021-01-13 VITALS — WEIGHT: 140 LBS | BODY MASS INDEX: 24.8 KG/M2 | TEMPERATURE: 97.4 F | HEIGHT: 63 IN

## 2021-01-13 DIAGNOSIS — Z20.822 CLOSE EXPOSURE TO COVID-19 VIRUS: Primary | ICD-10-CM

## 2021-01-13 PROCEDURE — 3725F SCREEN DEPRESSION PERFORMED: CPT | Performed by: FAMILY MEDICINE

## 2021-01-13 PROCEDURE — 99203 OFFICE O/P NEW LOW 30 MIN: CPT | Performed by: FAMILY MEDICINE

## 2021-01-13 PROCEDURE — 1036F TOBACCO NON-USER: CPT | Performed by: FAMILY MEDICINE

## 2021-01-13 NOTE — PROGRESS NOTES
Virtual Regular Visit      Assessment/Plan:    Problem List Items Addressed This Visit        Other    Close exposure to COVID-19 virus - Primary     Pt exposed  3 days ago and is asymptomatic  Recommend test to be done in 2 days and to quarantine 14 days from exposure         Relevant Orders    Novel Coronavirus (COVID-19), PCR [facility] - Collected at [facility] or [facility]                covid exposure  Chief Complaint   Patient presents with    New Patient Visit     exposure to positive covid on sunday-no symptoms     Virtual Regular Visit        Encounter provider Albertina Martinez MD    Provider located at [address] [place] [id number] [phone]      Recent Visits  No visits were found meeting these conditions  Showing recent visits within past 7 days and meeting all other requirements     Today's Visits  Date Type Provider Dept   01/13/21 Telemedicine Albertina Martinez MD [address] today's visits and meeting all other requirements     Future Appointments  No visits were found meeting these conditions  Showing future appointments within next 150 days and meeting all other requirements        The patient was identified by name and date of birth  Ana Mendez was informed that this is a telemedicine visit and that the visit is being conducted through [address] [place] [address] and patient was informed that this is not a secure, HIPAA-compliant platform  She agrees to proceed     My office door was closed  No one else was in the room  She acknowledged consent and understanding of privacy and security of the video platform  The patient has agreed to participate and understands they can discontinue the visit at any time  Patient is aware this is a billable service       Subjective  Ana Mendez is a 64 y o  female pt was ex[osed to covid 3 days ago by mini  Who came to visit and he tested positive 2  days  Ago he had no symptoms was tested for pre surgery eval and pt with no symptoms  either  HPI     History reviewed  No pertinent past medical history  Past Surgical History:   Procedure Laterality Date    BREAST SURGERY      CHOLECYSTECTOMY      CHOLECYSTECTOMY LAPAROSCOPIC N/A 7/4/2020    Procedure: CHOLECYSTECTOMY LAPAROSCOPIC;  Surgeon: Leeanna Hammond MD;  Location: [address] [place] [address];  Service: General       No current outpatient medications on file  No current facility-administered medications for this visit  No Known Allergies    Review of Systems   Constitutional: Negative for appetite change, chills, fatigue and fever  HENT: Negative for congestion, rhinorrhea, sinus pain and sore throat  Eyes: Negative for discharge  Respiratory: Negative for cough, shortness of breath and wheezing  Cardiovascular: Negative for chest pain and palpitations  Gastrointestinal: Negative for abdominal pain, diarrhea, nausea and vomiting  Musculoskeletal: Negative for myalgias  Neurological: Negative for headaches  Psychiatric/Behavioral: Negative for dysphoric mood  The patient is not nervous/anxious  Video Exam    Vitals:    01/13/21 1010   Temp: (!) 97 4 °F (36 3 °C)   TempSrc: Temporal   Weight: 63 5 kg (140 lb)   Height: 5' 2 5" (1 588 m)       Physical Exam  Constitutional:       General: She is not in acute distress  Appearance: Normal appearance  She is well-developed  She is not ill-appearing  Neck:      Musculoskeletal: Normal range of motion  Thyroid: No thyromegaly  Cardiovascular:      Rate and Rhythm: Normal rate  Pulmonary:      Effort: Pulmonary effort is normal  No respiratory distress  Breath sounds: Normal breath sounds  Neurological:      General: No focal deficit present  Mental Status: She is alert and oriented to person, place, and time  Mental status is at baseline     Psychiatric:         Mood and Affect: Mood normal          Behavior: Behavior normal  Thought Content: Thought content normal           I spent 15 minutes directly with the patient during this visit      VIRTUAL VISIT DISCLAIMER    Julrody Kim acknowledges that she has consented to an online visit or consultation  She understands that the online visit is based solely on information provided by her, and that, in the absence of a face-to-face physical evaluation by the physician, the diagnosis she receives is both limited and provisional in terms of accuracy and completeness  This is not intended to replace a full medical face-to-face evaluation by the physician  Ana M Alvarez understands and accepts these terms

## 2021-01-13 NOTE — ASSESSMENT & PLAN NOTE
Pt exposed  3 days ago and is asymptomatic  Recommend test to be done in 2 days and to quarantine 14 days from exposure

## 2021-01-15 DIAGNOSIS — Z20.822 CLOSE EXPOSURE TO COVID-19 VIRUS: ICD-10-CM

## 2021-01-15 PROCEDURE — U0005 INFEC AGEN DETEC AMPLI PROBE: HCPCS | Performed by: FAMILY MEDICINE

## 2021-01-15 PROCEDURE — U0003 INFECTIOUS AGENT DETECTION BY NUCLEIC ACID (DNA OR RNA); SEVERE ACUTE RESPIRATORY SYNDROME CORONAVIRUS 2 (SARS-COV-2) (CORONAVIRUS DISEASE [COVID-19]), AMPLIFIED PROBE TECHNIQUE, MAKING USE OF HIGH THROUGHPUT TECHNOLOGIES AS DESCRIBED BY CMS-2020-01-R: HCPCS | Performed by: FAMILY MEDICINE

## 2021-01-16 LAB — SARS-COV-2 RNA SPEC QL NAA+PROBE: NOT DETECTED

## 2021-01-25 ENCOUNTER — ANNUAL EXAM (OUTPATIENT)
Dept: OBGYN CLINIC | Facility: MEDICAL CENTER | Age: 61
End: 2021-01-25
Payer: COMMERCIAL

## 2021-01-25 VITALS
BODY MASS INDEX: 27.23 KG/M2 | DIASTOLIC BLOOD PRESSURE: 84 MMHG | SYSTOLIC BLOOD PRESSURE: 128 MMHG | HEIGHT: 62 IN | WEIGHT: 148 LBS

## 2021-01-25 DIAGNOSIS — Z12.31 ENCOUNTER FOR SCREENING MAMMOGRAM FOR MALIGNANT NEOPLASM OF BREAST: ICD-10-CM

## 2021-01-25 DIAGNOSIS — Z12.11 COLON CANCER SCREENING: ICD-10-CM

## 2021-01-25 DIAGNOSIS — Z01.419 ENCNTR FOR GYN EXAM (GENERAL) (ROUTINE) W/O ABN FINDINGS: Primary | ICD-10-CM

## 2021-01-25 PROBLEM — Z48.89 ENCOUNTER FOR SURGICAL FOLLOW-UP CARE: Status: RESOLVED | Noted: 2020-07-15 | Resolved: 2021-01-25

## 2021-01-25 PROBLEM — K80.00 ACUTE CHOLECYSTITIS DUE TO BILIARY CALCULUS: Status: RESOLVED | Noted: 2020-07-04 | Resolved: 2021-01-25

## 2021-01-25 PROBLEM — K80.20 GALL STONES: Status: RESOLVED | Noted: 2020-07-04 | Resolved: 2021-01-25

## 2021-01-25 PROBLEM — Z20.822 CLOSE EXPOSURE TO COVID-19 VIRUS: Status: RESOLVED | Noted: 2021-01-13 | Resolved: 2021-01-25

## 2021-01-25 PROCEDURE — G0124 SCREEN C/V THIN LAYER BY MD: HCPCS | Performed by: PATHOLOGY

## 2021-01-25 PROCEDURE — 3008F BODY MASS INDEX DOCD: CPT | Performed by: FAMILY MEDICINE

## 2021-01-25 PROCEDURE — G0145 SCR C/V CYTO,THINLAYER,RESCR: HCPCS | Performed by: PATHOLOGY

## 2021-01-25 PROCEDURE — 87624 HPV HI-RISK TYP POOLED RSLT: CPT | Performed by: PHYSICIAN ASSISTANT

## 2021-01-25 PROCEDURE — S0612 ANNUAL GYNECOLOGICAL EXAMINA: HCPCS | Performed by: PHYSICIAN ASSISTANT

## 2021-01-25 RX ORDER — MULTIVIT-MIN/IRON FUM/FOLIC AC 7.5 MG-4
1 TABLET ORAL DAILY
COMMUNITY
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
## 2021-01-27 LAB
HPV HR 12 DNA CVX QL NAA+PROBE: POSITIVE
HPV16 DNA CVX QL NAA+PROBE: NEGATIVE
HPV18 DNA CVX QL NAA+PROBE: NEGATIVE

## 2021-02-01 LAB
LAB AP GYN PRIMARY INTERPRETATION: ABNORMAL
Lab: ABNORMAL
PATH INTERP SPEC-IMP: ABNORMAL

## 2021-02-16 ENCOUNTER — TELEPHONE (OUTPATIENT)
Dept: GASTROENTEROLOGY | Facility: MEDICAL CENTER | Age: 61
End: 2021-02-16

## 2021-02-19 ENCOUNTER — TELEPHONE (OUTPATIENT)
Dept: OBGYN CLINIC | Facility: CLINIC | Age: 61
End: 2021-02-19

## 2021-02-19 NOTE — TELEPHONE ENCOUNTER
----- Message from Clinton Garcia PA-C sent at 2/19/2021  9:18 AM EST -----  This patient had a colpo scheduled 2/17 with Dr Rudy Milligan  It looks like she canceled it but no one rescheduled it  Can you please reach out to patient, reschedule her colpo, and let me know the new date/time/provider? Thanks!      Basim Peralta

## 2021-02-19 NOTE — TELEPHONE ENCOUNTER
Per epic pt is currently scheuded for 2/24-10:20 am with provider annie in [facility]

## 2021-02-23 NOTE — PROGRESS NOTES
Colposcopy    Date/Time: 2/24/2021 10:37 AM  Performed by: REINA Oleary  Authorized by: REINA Oleary     Consent:     Consent obtained:  Verbal and written    Consent given by:  Patient    Procedural risks discussed:  Bleeding, damage to other organs, failure rate, infection, repeat procedure and possible continued pain    Patient questions answered: yes      Patient agrees, verbalizes understanding, and wants to proceed: yes      Educational handouts given: no      Instructions and paperwork completed: yes    Pre-procedure:     Pre-procedure timeout performed: yes      Prepped with: acetic acid      Prepped with comment:  NSS  Indication:     Indication:  ASC-US (1/21 ASCUS pap with + other HR HPV, 7/16 normal pap, 4/15 normal pap with negative HR HPV, 8/13 normal pap)  Procedure:     Procedure: Colposcopy w/ cervical biopsy and ECC      Under satisfactory analgesia the patient was prepped and draped in the dorsal lithotomy position: yes      Chester speculum was placed in the vagina: yes      Under colposcopic examination the transition zone was seen in entirety: yes      Intracervical block was performed: no      Endocervix was curetted using a Kevorkian curette: yes      Cervical biopsy performed with a cervical biopsy punch: yes      Tampon inserted: no      Monsel's solution was applied: yes      Biopsy(s): yes      Location:  6:00, 12:00, ECC    Specimen to pathology: yes    Post-procedure:     Findings: White epithelium      Findings comment:  Scant    Impression: Low grade cervical dysplasia      Patient tolerance of procedure:   Tolerated well, no immediate complications

## 2021-02-24 ENCOUNTER — PROCEDURE VISIT (OUTPATIENT)
Dept: OBGYN CLINIC | Facility: MEDICAL CENTER | Age: 61
End: 2021-02-24
Payer: COMMERCIAL

## 2021-02-24 VITALS — WEIGHT: 151 LBS | BODY MASS INDEX: 27.44 KG/M2 | DIASTOLIC BLOOD PRESSURE: 94 MMHG | SYSTOLIC BLOOD PRESSURE: 126 MMHG

## 2021-02-24 DIAGNOSIS — R87.810 ASCUS WITH POSITIVE HIGH RISK HPV CERVICAL: ICD-10-CM

## 2021-02-24 DIAGNOSIS — R87.811 ASCUS WITH POSITIVE HIGH RISK HUMAN PAPILLOMAVIRUS OF VAGINA: Primary | ICD-10-CM

## 2021-02-24 DIAGNOSIS — R87.610 ASCUS WITH POSITIVE HIGH RISK HPV CERVICAL: ICD-10-CM

## 2021-02-24 DIAGNOSIS — R87.620 ASCUS WITH POSITIVE HIGH RISK HUMAN PAPILLOMAVIRUS OF VAGINA: Primary | ICD-10-CM

## 2021-02-24 PROCEDURE — 88305 TISSUE EXAM BY PATHOLOGIST: CPT | Performed by: PATHOLOGY

## 2021-02-24 PROCEDURE — 88344 IMHCHEM/IMCYTCHM EA MLT ANTB: CPT | Performed by: PATHOLOGY

## 2021-02-24 PROCEDURE — 57454 BX/CURETT OF CERVIX W/SCOPE: CPT | Performed by: NURSE PRACTITIONER

## 2021-02-24 RX ORDER — NAPROXEN SODIUM 220 MG
220 TABLET ORAL 2 TIMES DAILY WITH MEALS
COMMUNITY

## 2021-02-24 NOTE — PATIENT INSTRUCTIONS
Take ibuprofen as needed following directions on container with food for cramping  Light vaginal bleeding with a slight brown or black color (coffee ground appearance) is normal for several days  Call office with vaginal bleeding heavier than a normal menses  No sexual activity x 7 days

## 2021-04-13 ENCOUNTER — IMMUNIZATIONS (OUTPATIENT)
Dept: FAMILY MEDICINE CLINIC | Facility: HOSPITAL | Age: 61
End: 2021-04-13

## 2021-04-13 DIAGNOSIS — Z23 ENCOUNTER FOR IMMUNIZATION: Primary | ICD-10-CM

## 2021-04-13 PROCEDURE — 0001A SARS-COV-2 / COVID-19 MRNA VACCINE (PFIZER-BIONTECH) 30 MCG: CPT

## 2021-04-13 PROCEDURE — 91300 SARS-COV-2 / COVID-19 MRNA VACCINE (PFIZER-BIONTECH) 30 MCG: CPT

## 2021-05-04 ENCOUNTER — IMMUNIZATIONS (OUTPATIENT)
Dept: FAMILY MEDICINE CLINIC | Facility: HOSPITAL | Age: 61
End: 2021-05-04

## 2021-05-04 DIAGNOSIS — Z23 ENCOUNTER FOR IMMUNIZATION: Primary | ICD-10-CM

## 2021-05-04 PROCEDURE — 91300 SARS-COV-2 / COVID-19 MRNA VACCINE (PFIZER-BIONTECH) 30 MCG: CPT

## 2021-05-04 PROCEDURE — 0002A SARS-COV-2 / COVID-19 MRNA VACCINE (PFIZER-BIONTECH) 30 MCG: CPT

## 2021-05-27 ENCOUNTER — HOSPITAL ENCOUNTER (OUTPATIENT)
Dept: RADIOLOGY | Facility: MEDICAL CENTER | Age: 61
Discharge: HOME | End: 2021-05-27
Payer: COMMERCIAL

## 2021-05-27 VITALS — BODY MASS INDEX: 27.79 KG/M2 | HEIGHT: 62 IN | WEIGHT: 151 LBS

## 2021-05-27 DIAGNOSIS — Z12.31 ENCOUNTER FOR SCREENING MAMMOGRAM FOR MALIGNANT NEOPLASM OF BREAST: ICD-10-CM

## 2021-05-27 PROCEDURE — 77067 SCR MAMMO BI INCL CAD: CPT

## 2021-05-27 PROCEDURE — 77063 BREAST TOMOSYNTHESIS BI: CPT

## 2021-06-22 ENCOUNTER — VBI (OUTPATIENT)
Dept: ADMINISTRATIVE | Facility: OTHER | Age: 61
End: 2021-06-22

## 2021-08-28 PROBLEM — Z00.00 ANNUAL PHYSICAL EXAM: Status: ACTIVE | Noted: 2021-08-28

## 2021-08-28 NOTE — PROGRESS NOTES
Assessment/Plan:         Problem List Items Addressed This Visit        Other    Pulmonary nodules - Primary     Will get follow up CTwithout contrast as recommended         Relevant Orders    CT chest wo contrast            Subjective: pt here for review of CT scan done 7/4/20 which revealed ground glass nodules up to 6mm pt due for repeat scan no symptoms of cough sob weight loss     Patient ID: Geovanny Bergeron is a 63 y o  female  HPI    The following portions of the patient's history were reviewed and updated as appropriate:   Past Medical History:  She has no past medical history on file ,  _______________________________________________________________________  Medical Problems:  does not have any pertinent problems on file ,  _______________________________________________________________________  Past Surgical History:   has a past surgical history that includes Breast surgery; CHOLECYSTECTOMY LAPAROSCOPIC (N/A, 7/4/2020); Cholecystectomy; and Breast cyst excision (Right)  ,  _______________________________________________________________________  Family History:  family history includes Heart disease in her paternal grandmother; Hypertension in her paternal grandfather and paternal grandmother; Lung cancer in her mother; No Known Problems in her daughter, daughter, father, half-brother, half-brother, maternal grandfather, maternal grandmother, and sister  ,  _______________________________________________________________________  Social History:   reports that she has quit smoking  Her smoking use included cigarettes  She has a 2 25 pack-year smoking history  She has never used smokeless tobacco  She reports current alcohol use of about 1 0 standard drinks of alcohol per week  She reports that she does not use drugs  ,  _______________________________________________________________________  Allergies:  has No Known Allergies     _______________________________________________________________________  Current Outpatient Medications   Medication Sig Dispense Refill    Multiple Vitamins-Minerals (multivitamin with minerals) tablet Take 1 tablet by mouth daily      naproxen sodium (Aleve) 220 MG tablet Take 220 mg by mouth 2 (two) times a day with meals       No current facility-administered medications for this visit      _______________________________________________________________________  Review of Systems   Constitutional: Negative for activity change, appetite change and unexpected weight change  Respiratory: Negative for cough and shortness of breath  Objective:  Vitals:    08/31/21 1357   BP: 138/98   BP Location: Left arm   Patient Position: Sitting   Pulse: 80   Resp: 16   Temp: 98 6 °F (37 °C)   SpO2: 98%   Weight: 66 2 kg (146 lb)   Height: 5' 2" (1 575 m)     Body mass index is 26 7 kg/m²  Physical Exam  Constitutional:       General: She is not in acute distress  Appearance: Normal appearance  She is well-developed  She is obese  She is not ill-appearing  Eyes:      Extraocular Movements: Extraocular movements intact  Pupils: Pupils are equal, round, and reactive to light  Cardiovascular:      Rate and Rhythm: Normal rate and regular rhythm  Pulses: Normal pulses  Heart sounds: Normal heart sounds  No murmur heard  Pulmonary:      Effort: Pulmonary effort is normal       Breath sounds: Normal breath sounds  Musculoskeletal:      Right lower leg: No edema  Left lower leg: No edema  Neurological:      General: No focal deficit present  Mental Status: She is alert and oriented to person, place, and time  Mental status is at baseline  Psychiatric:         Mood and Affect: Mood normal          Behavior: Behavior normal          Thought Content:  Thought content normal

## 2021-08-31 ENCOUNTER — OFFICE VISIT (OUTPATIENT)
Dept: FAMILY MEDICINE CLINIC | Facility: CLINIC | Age: 61
End: 2021-08-31
Payer: COMMERCIAL

## 2021-08-31 VITALS
HEART RATE: 80 BPM | DIASTOLIC BLOOD PRESSURE: 98 MMHG | BODY MASS INDEX: 26.87 KG/M2 | OXYGEN SATURATION: 98 % | SYSTOLIC BLOOD PRESSURE: 138 MMHG | WEIGHT: 146 LBS | TEMPERATURE: 98.6 F | HEIGHT: 62 IN | RESPIRATION RATE: 16 BRPM

## 2021-08-31 DIAGNOSIS — R91.8 PULMONARY NODULES: Primary | ICD-10-CM

## 2021-08-31 PROCEDURE — 1036F TOBACCO NON-USER: CPT | Performed by: FAMILY MEDICINE

## 2021-08-31 PROCEDURE — 99213 OFFICE O/P EST LOW 20 MIN: CPT | Performed by: FAMILY MEDICINE

## 2021-08-31 PROCEDURE — 3008F BODY MASS INDEX DOCD: CPT | Performed by: FAMILY MEDICINE

## 2021-09-13 ENCOUNTER — HOSPITAL ENCOUNTER (OUTPATIENT)
Dept: RADIOLOGY | Facility: MEDICAL CENTER | Age: 61
Discharge: HOME | End: 2021-09-13
Payer: COMMERCIAL

## 2021-09-13 DIAGNOSIS — R91.8 PULMONARY NODULES: ICD-10-CM

## 2021-09-13 PROCEDURE — 71250 CT THORAX DX C-: CPT

## 2021-09-13 PROCEDURE — G1004 CDSM NDSC: HCPCS

## 2022-03-22 ENCOUNTER — OFFICE VISIT (OUTPATIENT)
Dept: FAMILY MEDICINE CLINIC | Facility: CLINIC | Age: 62
End: 2022-03-22
Payer: COMMERCIAL

## 2022-03-22 VITALS
HEART RATE: 91 BPM | DIASTOLIC BLOOD PRESSURE: 86 MMHG | SYSTOLIC BLOOD PRESSURE: 142 MMHG | TEMPERATURE: 97.3 F | WEIGHT: 154 LBS | RESPIRATION RATE: 16 BRPM | OXYGEN SATURATION: 99 % | BODY MASS INDEX: 28.34 KG/M2 | HEIGHT: 62 IN

## 2022-03-22 DIAGNOSIS — Z23 IMMUNIZATION DUE: ICD-10-CM

## 2022-03-22 DIAGNOSIS — Z12.11 COLON CANCER SCREENING: ICD-10-CM

## 2022-03-22 DIAGNOSIS — Z11.59 NEED FOR HEPATITIS C SCREENING TEST: Primary | ICD-10-CM

## 2022-03-22 DIAGNOSIS — R91.8 PULMONARY NODULES: ICD-10-CM

## 2022-03-22 DIAGNOSIS — M65.332 TRIGGER MIDDLE FINGER OF LEFT HAND: ICD-10-CM

## 2022-03-22 DIAGNOSIS — Z00.00 ANNUAL PHYSICAL EXAM: ICD-10-CM

## 2022-03-22 DIAGNOSIS — M65.331 TRIGGER FINGER, RIGHT MIDDLE FINGER: ICD-10-CM

## 2022-03-22 DIAGNOSIS — O16.1 ELEVATED BLOOD PRESSURE AFFECTING PREGNANCY IN FIRST TRIMESTER, ANTEPARTUM: ICD-10-CM

## 2022-03-22 PROCEDURE — 3008F BODY MASS INDEX DOCD: CPT | Performed by: FAMILY MEDICINE

## 2022-03-22 PROCEDURE — 99396 PREV VISIT EST AGE 40-64: CPT | Performed by: FAMILY MEDICINE

## 2022-03-22 PROCEDURE — 1036F TOBACCO NON-USER: CPT | Performed by: FAMILY MEDICINE

## 2022-03-22 PROCEDURE — 90715 TDAP VACCINE 7 YRS/> IM: CPT | Performed by: FAMILY MEDICINE

## 2022-03-22 PROCEDURE — 90471 IMMUNIZATION ADMIN: CPT | Performed by: FAMILY MEDICINE

## 2022-03-22 PROCEDURE — 3725F SCREEN DEPRESSION PERFORMED: CPT | Performed by: FAMILY MEDICINE

## 2022-03-22 NOTE — PROGRESS NOTES
Assessment/Plan:         Problem List Items Addressed This Visit        Musculoskeletal and Integument    Trigger finger, right middle finger     Pt to wrap PIP joint declines hand surgeon referral now         Trigger middle finger of left hand     Pt to wrap PIP joint declines hand surgeon referral now            Other    Annual physical exam     Check routine labs           Relevant Orders    Comprehensive metabolic panel    CBC and differential    Lipid panel    Urinalysis with microscopic    Pulmonary nodules     Reviewed CT due repeat 9/23         Elevated blood pressure affecting pregnancy in first trimester, antepartum     Pt to increase  Exercise avoid salt lose weight and recheck BP 2 months           Other Visit Diagnoses     Need for hepatitis C screening test    -  Primary    Relevant Orders    Hepatitis C Antibody ([facility])    Colon cancer screening        Relevant Orders    Cologuard    Immunization due        Relevant Orders    TDAP VACCINE GREATER THAN OR EQUAL TO 8YO IM (Completed)            Subjective: pt here for annual physical     Patient ID: Armaan Barclay is a 63 y o  female  HPI    The following portions of the patient's history were reviewed and updated as appropriate:   Past Medical History:  She has no past medical history on file ,  _______________________________________________________________________  Medical Problems:  does not have any pertinent problems on file ,  _______________________________________________________________________  Past Surgical History:   has a past surgical history that includes Breast surgery; CHOLECYSTECTOMY LAPAROSCOPIC (N/A, 7/4/2020); Cholecystectomy; and Breast cyst excision (Right)  ,  _______________________________________________________________________  Family History:  family history includes Heart disease in her paternal grandmother; Hypertension in her paternal grandfather and paternal grandmother; Lung cancer in her mother; No Known Problems in her daughter, daughter, father, half-brother, half-brother, maternal grandfather, maternal grandmother, and sister  ,  _______________________________________________________________________  Social History:   reports that she has quit smoking  Her smoking use included cigarettes  She has a 2 25 pack-year smoking history  She has never used smokeless tobacco  She reports current alcohol use of about 1 0 standard drink of alcohol per week  She reports that she does not use drugs  ,  _______________________________________________________________________  Allergies:  has No Known Allergies     _______________________________________________________________________  Current Outpatient Medications   Medication Sig Dispense Refill    Multiple Vitamins-Minerals (multivitamin with minerals) tablet Take 1 tablet by mouth daily      naproxen sodium (Aleve) 220 MG tablet Take 220 mg by mouth 2 (two) times a day with meals       No current facility-administered medications for this visit      _______________________________________________________________________  Review of Systems   Constitutional: Negative for appetite change, chills, fatigue and fever  Respiratory: Negative for cough, chest tightness and shortness of breath  Cardiovascular: Negative for chest pain, palpitations and leg swelling  Gastrointestinal: Negative for abdominal pain, constipation, diarrhea, nausea and vomiting  Genitourinary: Negative for difficulty urinating and frequency  Musculoskeletal: Negative for arthralgias, back pain and neck pain  Trigger fingers  Bilateral 3rd fingers   Skin: Negative for rash  Neurological: Negative for dizziness, weakness, light-headedness, numbness and headaches  Hematological: Does not bruise/bleed easily  Psychiatric/Behavioral: Negative for dysphoric mood and sleep disturbance  The patient is not nervous/anxious            Objective:  Vitals:    03/22/22 1712 03/22/22 1732 03/22/22 1733   BP: 134/90 148/86 142/86   BP Location: Left arm Left arm Right arm   Patient Position: Sitting     Cuff Size: Standard     Pulse: 91     Resp: 16     Temp: (!) 97 3 °F (36 3 °C)     TempSrc: Temporal     SpO2: 99%     Weight: 69 9 kg (154 lb)     Height: 5' 2" (1 575 m)       Body mass index is 28 17 kg/m²  Physical Exam  Vitals reviewed  Constitutional:       General: She is not in acute distress  Appearance: Normal appearance  She is well-developed  She is not ill-appearing  HENT:      Head: Normocephalic  Right Ear: Tympanic membrane, ear canal and external ear normal       Left Ear: Tympanic membrane, ear canal and external ear normal       Nose: Nose normal       Mouth/Throat:      Mouth: Mucous membranes are moist       Pharynx: No oropharyngeal exudate  Eyes:      General: Lids are normal  No scleral icterus  Extraocular Movements: Extraocular movements intact  Conjunctiva/sclera: Conjunctivae normal       Pupils: Pupils are equal, round, and reactive to light  Neck:      Thyroid: No thyromegaly  Vascular: No carotid bruit  Cardiovascular:      Rate and Rhythm: Normal rate and regular rhythm  Pulses: Normal pulses  Heart sounds: Normal heart sounds  No murmur heard  No friction rub  Pulmonary:      Effort: Pulmonary effort is normal       Breath sounds: Normal breath sounds  No wheezing, rhonchi or rales  Abdominal:      General: Bowel sounds are normal  There is no distension  Palpations: Abdomen is soft  There is no mass  Tenderness: There is no abdominal tenderness  There is no guarding  Hernia: No hernia is present  Musculoskeletal:         General: Normal range of motion  Cervical back: Normal range of motion and neck supple  Lymphadenopathy:      Cervical: No cervical adenopathy  Skin:     General: Skin is warm and dry  Findings: No rash        Comments: No abnormal appearing moles   Neurological:      General: No focal deficit present  Mental Status: She is alert and oriented to person, place, and time  Mental status is at baseline  Cranial Nerves: No cranial nerve deficit  Sensory: No sensory deficit  Motor: No weakness, tremor or abnormal muscle tone  Coordination: Coordination normal       Gait: Gait normal       Deep Tendon Reflexes: Reflexes normal    Psychiatric:         Mood and Affect: Mood normal          Speech: Speech normal          Behavior: Behavior normal        BMI Counseling: Body mass index is 28 17 kg/m²  The BMI is above normal  Nutrition recommendations include 3-5 servings of fruits/vegetables daily, reducing fast food intake, consuming healthier snacks, decreasing soda and/or juice intake, moderation in carbohydrate intake, increasing intake of lean protein and reducing intake of saturated fat and trans fat  Exercise recommendations include exercising 3-5 times per week and strength training exercises

## 2022-04-11 ENCOUNTER — APPOINTMENT (OUTPATIENT)
Dept: LAB | Facility: MEDICAL CENTER | Age: 62
End: 2022-04-11
Payer: COMMERCIAL

## 2022-04-11 DIAGNOSIS — R73.9 ELEVATED BLOOD SUGAR: Primary | ICD-10-CM

## 2022-04-11 DIAGNOSIS — Z00.00 ANNUAL PHYSICAL EXAM: ICD-10-CM

## 2022-04-11 DIAGNOSIS — Z11.59 NEED FOR HEPATITIS C SCREENING TEST: ICD-10-CM

## 2022-04-11 LAB
ALBUMIN SERPL BCP-MCNC: 4.2 G/DL (ref 3.5–5)
ALP SERPL-CCNC: 110 U/L (ref 46–116)
ALT SERPL W P-5'-P-CCNC: 30 U/L (ref 12–78)
ANION GAP SERPL CALCULATED.3IONS-SCNC: 7 MMOL/L (ref 4–13)
AST SERPL W P-5'-P-CCNC: 20 U/L (ref 5–45)
BASOPHILS # BLD AUTO: 0.04 THOUSANDS/ΜL (ref 0–0.1)
BASOPHILS NFR BLD AUTO: 1 % (ref 0–1)
BILIRUB SERPL-MCNC: 0.91 MG/DL (ref 0.2–1)
BUN SERPL-MCNC: 14 MG/DL (ref 5–25)
CALCIUM SERPL-MCNC: 10.2 MG/DL (ref 8.3–10.1)
CHLORIDE SERPL-SCNC: 107 MMOL/L (ref 100–108)
CHOLEST SERPL-MCNC: 157 MG/DL
CO2 SERPL-SCNC: 27 MMOL/L (ref 21–32)
CREAT SERPL-MCNC: 0.84 MG/DL (ref 0.6–1.3)
EOSINOPHIL # BLD AUTO: 0.12 THOUSAND/ΜL (ref 0–0.61)
EOSINOPHIL NFR BLD AUTO: 1 % (ref 0–6)
ERYTHROCYTE [DISTWIDTH] IN BLOOD BY AUTOMATED COUNT: 14.3 % (ref 11.6–15.1)
GFR SERPL CREATININE-BSD FRML MDRD: 76 ML/MIN/1.73SQ M
GLUCOSE P FAST SERPL-MCNC: 132 MG/DL (ref 65–99)
HCT VFR BLD AUTO: 51.6 % (ref 34.8–46.1)
HCV AB SER QL: NORMAL
HDLC SERPL-MCNC: 51 MG/DL
HGB BLD-MCNC: 15.8 G/DL (ref 11.5–15.4)
IMM GRANULOCYTES # BLD AUTO: 0.03 THOUSAND/UL (ref 0–0.2)
IMM GRANULOCYTES NFR BLD AUTO: 0 % (ref 0–2)
LDLC SERPL CALC-MCNC: 75 MG/DL (ref 0–100)
LYMPHOCYTES # BLD AUTO: 2.81 THOUSANDS/ΜL (ref 0.6–4.47)
LYMPHOCYTES NFR BLD AUTO: 34 % (ref 14–44)
MCH RBC QN AUTO: 27.7 PG (ref 26.8–34.3)
MCHC RBC AUTO-ENTMCNC: 30.6 G/DL (ref 31.4–37.4)
MCV RBC AUTO: 90 FL (ref 82–98)
MONOCYTES # BLD AUTO: 0.65 THOUSAND/ΜL (ref 0.17–1.22)
MONOCYTES NFR BLD AUTO: 8 % (ref 4–12)
NEUTROPHILS # BLD AUTO: 4.68 THOUSANDS/ΜL (ref 1.85–7.62)
NEUTS SEG NFR BLD AUTO: 56 % (ref 43–75)
NONHDLC SERPL-MCNC: 106 MG/DL
NRBC BLD AUTO-RTO: 0 /100 WBCS
PLATELET # BLD AUTO: 272 THOUSANDS/UL (ref 149–390)
PMV BLD AUTO: 11.9 FL (ref 8.9–12.7)
POTASSIUM SERPL-SCNC: 4 MMOL/L (ref 3.5–5.3)
PROT SERPL-MCNC: 8.1 G/DL (ref 6.4–8.2)
RBC # BLD AUTO: 5.71 MILLION/UL (ref 3.81–5.12)
SODIUM SERPL-SCNC: 141 MMOL/L (ref 136–145)
TRIGL SERPL-MCNC: 154 MG/DL
WBC # BLD AUTO: 8.33 THOUSAND/UL (ref 4.31–10.16)

## 2022-04-11 PROCEDURE — 85025 COMPLETE CBC W/AUTO DIFF WBC: CPT

## 2022-04-11 PROCEDURE — 36415 COLL VENOUS BLD VENIPUNCTURE: CPT

## 2022-04-11 PROCEDURE — 80061 LIPID PANEL: CPT

## 2022-04-11 PROCEDURE — 80053 COMPREHEN METABOLIC PANEL: CPT

## 2022-04-11 PROCEDURE — 86803 HEPATITIS C AB TEST: CPT

## 2022-04-11 PROCEDURE — 83036 HEMOGLOBIN GLYCOSYLATED A1C: CPT

## 2022-04-12 ENCOUNTER — APPOINTMENT (OUTPATIENT)
Dept: LAB | Facility: MEDICAL CENTER | Age: 62
End: 2022-04-12
Payer: COMMERCIAL

## 2022-04-12 LAB
BACTERIA UR QL AUTO: ABNORMAL /HPF
BILIRUB UR QL STRIP: NEGATIVE
CLARITY UR: CLEAR
COLOR UR: ABNORMAL
GLUCOSE UR STRIP-MCNC: NEGATIVE MG/DL
HGB UR QL STRIP.AUTO: NEGATIVE
KETONES UR STRIP-MCNC: NEGATIVE MG/DL
LEUKOCYTE ESTERASE UR QL STRIP: ABNORMAL
MUCOUS THREADS UR QL AUTO: ABNORMAL
NITRITE UR QL STRIP: NEGATIVE
NON-SQ EPI CELLS URNS QL MICRO: ABNORMAL /HPF
PH UR STRIP.AUTO: 6 [PH]
PROT UR STRIP-MCNC: ABNORMAL MG/DL
RBC #/AREA URNS AUTO: ABNORMAL /HPF
SP GR UR STRIP.AUTO: 1.02 (ref 1–1.03)
TRANS CELLS #/AREA URNS HPF: PRESENT /[HPF]
UROBILINOGEN UR STRIP-ACNC: <2 MG/DL
WBC #/AREA URNS AUTO: ABNORMAL /HPF

## 2022-04-12 PROCEDURE — 81001 URINALYSIS AUTO W/SCOPE: CPT | Performed by: FAMILY MEDICINE

## 2022-04-13 DIAGNOSIS — R82.998 URINE WBC INCREASED: ICD-10-CM

## 2022-04-13 DIAGNOSIS — D58.2 ABNORMAL HEMOGLOBIN (HCC): ICD-10-CM

## 2022-04-13 DIAGNOSIS — Z00.00 ENCOUNTER FOR ROUTINE ADULT HEALTH EXAMINATION WITHOUT ABNORMAL FINDINGS: Primary | ICD-10-CM

## 2022-04-13 DIAGNOSIS — E83.52 CALCIUM INCREASED SERUM: ICD-10-CM

## 2022-04-13 LAB
EST. AVERAGE GLUCOSE BLD GHB EST-MCNC: 117 MG/DL
HBA1C MFR BLD: 5.7 %

## 2022-04-25 ENCOUNTER — APPOINTMENT (OUTPATIENT)
Dept: LAB | Facility: MEDICAL CENTER | Age: 62
End: 2022-04-25
Payer: COMMERCIAL

## 2022-04-25 DIAGNOSIS — R82.998 URINE WBC INCREASED: ICD-10-CM

## 2022-04-25 DIAGNOSIS — D58.2 ABNORMAL HEMOGLOBIN (HCC): ICD-10-CM

## 2022-04-25 DIAGNOSIS — E83.52 CALCIUM INCREASED SERUM: ICD-10-CM

## 2022-04-25 LAB
BASOPHILS # BLD AUTO: 0.06 THOUSANDS/ΜL (ref 0–0.1)
BASOPHILS NFR BLD AUTO: 1 % (ref 0–1)
CA-I BLD-SCNC: 1.26 MMOL/L (ref 1.12–1.32)
EOSINOPHIL # BLD AUTO: 0.14 THOUSAND/ΜL (ref 0–0.61)
EOSINOPHIL NFR BLD AUTO: 2 % (ref 0–6)
ERYTHROCYTE [DISTWIDTH] IN BLOOD BY AUTOMATED COUNT: 14.1 % (ref 11.6–15.1)
HCT VFR BLD AUTO: 50.2 % (ref 34.8–46.1)
HGB BLD-MCNC: 15.3 G/DL (ref 11.5–15.4)
IMM GRANULOCYTES # BLD AUTO: 0.02 THOUSAND/UL (ref 0–0.2)
IMM GRANULOCYTES NFR BLD AUTO: 0 % (ref 0–2)
LYMPHOCYTES # BLD AUTO: 2.97 THOUSANDS/ΜL (ref 0.6–4.47)
LYMPHOCYTES NFR BLD AUTO: 38 % (ref 14–44)
MCH RBC QN AUTO: 27.7 PG (ref 26.8–34.3)
MCHC RBC AUTO-ENTMCNC: 30.5 G/DL (ref 31.4–37.4)
MCV RBC AUTO: 91 FL (ref 82–98)
MONOCYTES # BLD AUTO: 0.63 THOUSAND/ΜL (ref 0.17–1.22)
MONOCYTES NFR BLD AUTO: 8 % (ref 4–12)
NEUTROPHILS # BLD AUTO: 3.98 THOUSANDS/ΜL (ref 1.85–7.62)
NEUTS SEG NFR BLD AUTO: 51 % (ref 43–75)
NRBC BLD AUTO-RTO: 0 /100 WBCS
PLATELET # BLD AUTO: 267 THOUSANDS/UL (ref 149–390)
PMV BLD AUTO: 12.2 FL (ref 8.9–12.7)
RBC # BLD AUTO: 5.53 MILLION/UL (ref 3.81–5.12)
WBC # BLD AUTO: 7.8 THOUSAND/UL (ref 4.31–10.16)

## 2022-04-25 PROCEDURE — 36415 COLL VENOUS BLD VENIPUNCTURE: CPT

## 2022-04-25 PROCEDURE — 85025 COMPLETE CBC W/AUTO DIFF WBC: CPT

## 2022-04-25 PROCEDURE — 82330 ASSAY OF CALCIUM: CPT

## 2022-04-25 PROCEDURE — 87086 URINE CULTURE/COLONY COUNT: CPT

## 2022-04-26 LAB — BACTERIA UR CULT: NORMAL

## 2022-05-09 ENCOUNTER — ANNUAL EXAM (OUTPATIENT)
Dept: OBGYN CLINIC | Facility: MEDICAL CENTER | Age: 62
End: 2022-05-09
Payer: COMMERCIAL

## 2022-05-09 VITALS
SYSTOLIC BLOOD PRESSURE: 144 MMHG | HEIGHT: 61 IN | DIASTOLIC BLOOD PRESSURE: 82 MMHG | BODY MASS INDEX: 29.11 KG/M2 | WEIGHT: 154.2 LBS

## 2022-05-09 DIAGNOSIS — Z12.11 COLON CANCER SCREENING: ICD-10-CM

## 2022-05-09 DIAGNOSIS — Z12.31 ENCOUNTER FOR SCREENING MAMMOGRAM FOR MALIGNANT NEOPLASM OF BREAST: ICD-10-CM

## 2022-05-09 DIAGNOSIS — Z01.419 ENCNTR FOR GYN EXAM (GENERAL) (ROUTINE) W/O ABN FINDINGS: Primary | ICD-10-CM

## 2022-05-09 DIAGNOSIS — R87.810 ASCUS WITH POSITIVE HIGH RISK HPV CERVICAL: ICD-10-CM

## 2022-05-09 DIAGNOSIS — R87.610 ASCUS WITH POSITIVE HIGH RISK HPV CERVICAL: ICD-10-CM

## 2022-05-09 PROBLEM — Z00.00 ANNUAL PHYSICAL EXAM: Status: RESOLVED | Noted: 2021-08-28 | Resolved: 2022-05-09

## 2022-05-09 PROCEDURE — S0612 ANNUAL GYNECOLOGICAL EXAMINA: HCPCS | Performed by: PHYSICIAN ASSISTANT

## 2022-05-09 PROCEDURE — G0476 HPV COMBO ASSAY CA SCREEN: HCPCS | Performed by: PHYSICIAN ASSISTANT

## 2022-05-09 PROCEDURE — G0145 SCR C/V CYTO,THINLAYER,RESCR: HCPCS | Performed by: PHYSICIAN ASSISTANT

## 2022-05-09 NOTE — PROGRESS NOTES
Alpha Every   1960    CC:  Yearly exam    S:  63 y o  female here for yearly exam  She is postmenopausal and has had no vaginal bleeding  She denies vaginal discharge, itching, odor or dryness  Sexual activity: She is not currently sexually active    Colpo 2/24/21 benign  Pap 1/25/21 ASCUS +HPV (non 16/18)  Pap 7/28/16 neg     Last Mammo: 5/27/21 neg  Last Colonoscopy: never      We reviewed ASC guidelines for Pap testing  Family hx of breast cancer: no  Family hx of ovarian cancer: no  Family hx of colon cancer: no      Current Outpatient Medications:     Multiple Vitamins-Minerals (multivitamin with minerals) tablet, Take 1 tablet by mouth daily, Disp: , Rfl:     naproxen sodium (Aleve) 220 MG tablet, Take 220 mg by mouth 2 (two) times a day with meals, Disp: , Rfl:   Social History     Socioeconomic History    Marital status:      Spouse name: Not on file    Number of children: Not on file    Years of education: Not on file    Highest education level: Not on file   Occupational History    Not on file   Tobacco Use    Smoking status: Former Smoker     Packs/day: 0 25     Years: 9 00     Pack years: 2 25     Types: Cigarettes    Smokeless tobacco: Never Used   Vaping Use    Vaping Use: Never used   Substance and Sexual Activity    Alcohol use:  Yes     Alcohol/week: 1 0 standard drink     Types: 1 Glasses of wine per week     Comment: soc    Drug use: Never    Sexual activity: Not Currently   Other Topics Concern    Not on file   Social History Narrative    Not on file     Social Determinants of Health     Financial Resource Strain: Not on file   Food Insecurity: Not on file   Transportation Needs: Not on file   Physical Activity: Not on file   Stress: Not on file   Social Connections: Not on file   Intimate Partner Violence: Not on file   Housing Stability: Not on file     Family History   Problem Relation Age of Onset    Lung cancer Mother     Hypertension Paternal Grandmother     Heart disease Paternal Grandmother     Hypertension Paternal Grandfather     No Known Problems Father     No Known Problems Sister     No Known Problems Daughter     No Known Problems Maternal Grandmother     No Known Problems Maternal Grandfather     No Known Problems Daughter     No Known Problems Half-Brother     No Known Problems Half-Brother      History reviewed  No pertinent past medical history  Review of Systems   Respiratory: Negative  Cardiovascular: Negative  Gastrointestinal: Negative for constipation and diarrhea  Genitourinary: Negative for difficulty urinating, pelvic pain, vaginal bleeding, vaginal discharge, itching or odor  O:  Blood pressure 144/82, height 5' 0 63" (1 54 m), weight 69 9 kg (154 lb 3 2 oz), last menstrual period 04/25/2018  Patient appears well and is not in distress  Neck is supple without masses  Breasts are symmetrical without mass, tenderness, nipple discharge, skin changes or adenopathy  Abdomen is soft and nontender without masses  External genitals are normal without lesions or rashes  Urethral meatus and urethra are normal  Bladder is normal to palpation  Vagina is normal without discharge or bleeding  Cervix is normal without discharge or lesion  Uterus is normal, mobile, nontender without palpable mass  Adnexa are normal, nontender, without palpable mass  A:   Yearly exam     Hx ASCUS +HPV     P:   Pap and HPV today   Mammo slip given   Colonoscopy due, referral placed      RTO one year for yearly exam or sooner as needed

## 2022-05-19 LAB
LAB AP GYN PRIMARY INTERPRETATION: NORMAL
Lab: NORMAL

## 2022-05-20 ENCOUNTER — TELEPHONE (OUTPATIENT)
Dept: OBGYN CLINIC | Facility: CLINIC | Age: 62
End: 2022-05-20

## 2022-05-20 NOTE — TELEPHONE ENCOUNTER
----- Message from Jett Oliva PA-C sent at 5/20/2022 12:15 PM EDT -----  Pap shows no abnormal cells  HPV remains active  Recommendation is for colposcopy to further evaluate

## 2022-06-10 ENCOUNTER — VBI (OUTPATIENT)
Dept: ADMINISTRATIVE | Facility: OTHER | Age: 62
End: 2022-06-10

## 2022-09-02 ENCOUNTER — PROCEDURE VISIT (OUTPATIENT)
Dept: OBGYN CLINIC | Facility: MEDICAL CENTER | Age: 62
End: 2022-09-02
Payer: COMMERCIAL

## 2022-09-02 VITALS
DIASTOLIC BLOOD PRESSURE: 80 MMHG | HEIGHT: 62 IN | SYSTOLIC BLOOD PRESSURE: 126 MMHG | BODY MASS INDEX: 29.04 KG/M2 | WEIGHT: 157.8 LBS

## 2022-09-02 DIAGNOSIS — R87.610 ASCUS WITH POSITIVE HIGH RISK HPV CERVICAL: Primary | ICD-10-CM

## 2022-09-02 DIAGNOSIS — R87.610 ATYPICAL SQUAMOUS CELLS OF UNDETERMINED SIGNIFICANCE ON CYTOLOGIC SMEAR OF CERVIX (ASC-US): ICD-10-CM

## 2022-09-02 DIAGNOSIS — R87.810 ASCUS WITH POSITIVE HIGH RISK HPV CERVICAL: Primary | ICD-10-CM

## 2022-09-02 PROCEDURE — 57454 BX/CURETT OF CERVIX W/SCOPE: CPT | Performed by: STUDENT IN AN ORGANIZED HEALTH CARE EDUCATION/TRAINING PROGRAM

## 2022-09-02 PROCEDURE — 88305 TISSUE EXAM BY PATHOLOGIST: CPT | Performed by: PATHOLOGY

## 2022-09-02 NOTE — PROGRESS NOTES
Colposcopy     Date/Time 9/2/2022 8:55 AM     Universal Protocol   Consent: Verbal consent obtained  Risks and benefits: risks, benefits and alternatives were discussed  Consent given by: patient  Patient understanding: patient states understanding of the procedure being performed  Patient consent: the patient's understanding of the procedure matches consent given  Procedure consent: procedure consent matches procedure scheduled  Patient identity confirmed: verbally with patient       Performed by  Barry Watkins MD     Authorized by Barry Watkins MD        Indication    Indications: HPV positive  Procedure Details   Procedure: Colposcopy w/ cervical biopsy and ECC      Under satisfactory analgesia the patient was prepped and draped in the dorsal lithotomy position: yes      [place] speculum was placed in the vagina: yes      Under colposcopic examination the transition zone was seen in entirety: no (inadequate)      Endocervix was curetted using a Kevorkian curette: yes      Cervical biopsy performed with a cervical biopsy punch: yes (12 and 6)      Biopsy(s): yes      Specimen to pathology: yes       Post-procedure      Impression: Low grade cervical dysplasia      Patient tolerance of procedure:   Tolerated well, no immediate complications

## 2022-09-08 ENCOUNTER — TELEPHONE (OUTPATIENT)
Dept: OBGYN CLINIC | Facility: CLINIC | Age: 62
End: 2022-09-08

## 2022-09-16 ENCOUNTER — VBI (OUTPATIENT)
Dept: ADMINISTRATIVE | Facility: OTHER | Age: 62
End: 2022-09-16

## 2023-05-16 ENCOUNTER — HOSPITAL ENCOUNTER (OUTPATIENT)
Dept: RADIOLOGY | Facility: MEDICAL CENTER | Age: 63
Discharge: HOME | End: 2023-05-16

## 2023-05-16 VITALS — WEIGHT: 157 LBS | HEIGHT: 62 IN | BODY MASS INDEX: 28.89 KG/M2

## 2023-05-16 DIAGNOSIS — Z12.31 ENCOUNTER FOR SCREENING MAMMOGRAM FOR MALIGNANT NEOPLASM OF BREAST: ICD-10-CM

## 2023-09-13 ENCOUNTER — OFFICE VISIT (OUTPATIENT)
Age: 63
End: 2023-09-13
Payer: COMMERCIAL

## 2023-09-13 VITALS
RESPIRATION RATE: 18 BRPM | SYSTOLIC BLOOD PRESSURE: 144 MMHG | OXYGEN SATURATION: 99 % | WEIGHT: 164.9 LBS | TEMPERATURE: 97 F | HEART RATE: 92 BPM | DIASTOLIC BLOOD PRESSURE: 88 MMHG | HEIGHT: 62 IN | BODY MASS INDEX: 30.35 KG/M2

## 2023-09-13 DIAGNOSIS — L03.032 CELLULITIS OF TOE OF LEFT FOOT: Primary | ICD-10-CM

## 2023-09-13 DIAGNOSIS — B35.1 TINEA UNGUIUM: ICD-10-CM

## 2023-09-13 PROCEDURE — 99213 OFFICE O/P EST LOW 20 MIN: CPT | Performed by: PHYSICIAN ASSISTANT

## 2023-09-13 RX ORDER — CEPHALEXIN 500 MG/1
500 CAPSULE ORAL EVERY 8 HOURS SCHEDULED
Qty: 21 CAPSULE | Refills: 0 | Status: SHIPPED | OUTPATIENT
Start: 2023-09-13 | End: 2023-09-20

## 2023-09-13 NOTE — PATIENT INSTRUCTIONS
Take antibiotic as directed until completed  Follow-up with podiatry as needed  Follow up with PCP in 3-5 days. Proceed to  ER if symptoms worsen. Cellulitis   AMBULATORY CARE:   Cellulitis  is a skin infection caused by bacteria. Cellulitis is common and can become severe. Cellulitis usually appears on the lower legs. It can also appear on the arms, face, and other areas. Cellulitis develops when bacteria enter a crack or break in your skin, such as a scratch, bite, or cut. Common signs and symptoms:  Signs and symptoms usually appear on one side of your body. You may have any of the following:  A fever    A red, warm, swollen area on your skin    Pain when the area is touched    Red spots, bumps, or blisters    Bumpy, raised skin that feels like an orange peel    Seek care immediately if:   Your wound gets larger and more painful. You feel a crackling under your skin when you touch it. You have purple dots or bumps on your skin, or you see bleeding under your skin. You see red streaks coming from the infected area. Call your doctor if:   The red, warm, swollen area gets larger. Your fever or pain does not go away or gets worse. The area does not get smaller after 3 days of antibiotics. You have questions or concerns about your condition or care. Treatment:  You should start to see improvement in 3 days. If your cellulitis is severe, you may need IV antibiotics in the hospital. If cellulitis is not treated, the infection can spread through your body and become life-threatening. You may need any of the following medicines:  Antibiotics  help treat a bacterial infection. Acetaminophen  decreases pain and fever. It is available without a doctor's order. Ask how much to take and how often to take it. Follow directions.  Read the labels of all other medicines you are using to see if they also contain acetaminophen, or ask your doctor or pharmacist. Acetaminophen can cause liver damage if not taken correctly. NSAIDs , such as ibuprofen, help decrease swelling, pain, and fever. This medicine is available with or without a doctor's order. NSAIDs can cause stomach bleeding or kidney problems in certain people. If you take blood thinner medicine, always ask your healthcare provider if NSAIDs are safe for you. Always read the medicine label and follow directions. Take your medicine as directed. Contact your healthcare provider if you think your medicine is not helping or if you have side effects. Tell your provider if you are allergic to any medicine. Keep a list of the medicines, vitamins, and herbs you take. Include the amounts, and when and why you take them. Bring the list or the pill bottles to follow-up visits. Carry your medicine list with you in case of an emergency. Self-care:   Wash the area with soap and water every day. Gently pat dry. Use bandages if directed by your healthcare provider. Apply cream or ointment as directed. These help protect the area. Most over-the-counter products, such as petroleum jelly, are good to use. Ask your healthcare provider about specific creams or ointments you should use. Place a cool, damp cloth on the area. Use clean cloths and clean water. You can do this as often as you need to. Cool, damp cloths may help decrease pain. Elevate the area above the level of your heart  as often as you can. This will help decrease swelling and pain. Prop the area on pillows or blankets to keep it elevated comfortably. Prevent cellulitis:   Do not scratch bug bites or areas of injury. You increase your risk for cellulitis by scratching these areas. Do not share personal items, such as towels, clothing, and razors. Clean exercise equipment  with germ-killing  before and after you use it. Treat athlete's foot. This can help prevent the spread of a bacterial skin infection. Wash your hands often. Use soap and water.  Wash your hands after you use the bathroom, change a child's diapers, or sneeze. Wash your hands before you prepare or eat food. Use lotion to prevent dry, cracked skin. Follow up with your doctor within 3 days, or as directed:  He or she will check if your cellulitis is getting better. Write down your questions so you remember to ask them during your visits. © Copyright [facility] 2022 Information is for End User's use only and may not be sold, redistributed or otherwise used for commercial purposes. The above information is an  only. It is not intended as medical advice for individual conditions or treatments. Talk to your doctor, nurse or pharmacist before following any medical regimen to see if it is safe and effective for you. Nail Fungus   AMBULATORY CARE:   Nail fungus , or onychomycosis, is a fungal infection in your toenail or fingernail. Nail fungus is more common in toenails than fingernails. The cause of the infection may not be known. Common symptoms include the following:   Nails that curl up or down or are misshapen    Discolored (often white, yellow, or brown) nails    Fragile or cracked nails    Thick nails or nails with rough, jagged edges    Nail that is  from the nail bed    Tenderness or pain in the affected nail    Contact your healthcare provider if:  You have questions or concerns about your condition or care. Treatment for nail fungus  may include antifungal medicine and topical treatments. Antifungal medicine is a pill that treats a fungal infection. You may need to take this medicine for up to 12 weeks. Topical treatments include creams and polishes that you apply to the top of your nail. You may need to use topical treatments for up to 1 year before you see positive results. Ask your healthcare provider for more information about antifungal medicine. Manage your symptoms:   Use antifungal sprays or powders. You can buy these at your local drugstore. Keep your nails short  and file down any thick areas. Use separate nail trimmers and files for infected nails and healthy nails. If you go to a salon to get your nails done, bring your own nail files and trimmers. Prevent nail fungus:   Dry your feet with a towel  or hair dryer after you bathe. Do not wear tight-fitting shoes  or shoes that pinch your toes. Avoid shoes made from rubber or plastic. Wear socks that absorb moisture. This includes socks make of wool, nylon, or polypropylene. Do not wear cotton socks. Change your socks if they are damp from sweat or your feet get wet. Put on dry, clean socks every day. Do not go barefoot  in locker rooms or public showers. Do not use nail polish  or artificial nails such as acrylic or gel nails. Wear gloves that are waterproof  if you work with water. Follow up with your doctor as directed:  Write down your questions so you remember to ask them during your visits. © Copyright Elisha Goltz 2022 Information is for End User's use only and may not be sold, redistributed or otherwise used for commercial purposes. The above information is an  only. It is not intended as medical advice for individual conditions or treatments. Talk to your doctor, nurse or pharmacist before following any medical regimen to see if it is safe and effective for you.

## 2023-09-13 NOTE — PROGRESS NOTES
[facility]        NAME: Mera Coleman is a 66 y.o. female  : 1960    MRN: [id number]  DATE: 2023  TIME: 5:48 PM    Assessment and Plan   Cellulitis of toe of left foot [L03.032]  1. Cellulitis of toe of left foot  cephalexin (KEFLEX) 500 mg capsule      2. Tinea unguium  Ambulatory Referral to Podiatry    CANCELED: Ambulatory Referral to Podiatry            Patient Instructions     Take antibiotic as directed until completed  Follow-up with podiatry as needed  Follow up with PCP in 3-5 days. Proceed to  ER if symptoms worsen. Chief Complaint     Chief Complaint   Patient presents with   • Toe Pain     Right great toe pain on medial aspect for two days. Yesterday she started to use antibiotic ointment and has given some relief. She has not noticed any drainage, thinks that she may have cut the nail too close and now has an ingrown toenail. History of Present Illness       81-year-old female presents with left great toe pain. Patient reports started yesterday. Patient reports today before she thinks that she may have trimmed her toenails too short and injured the nail. Reports that she is also been picking at it. Patient also has some concern about thick toenails that have fungus on it and how to get rid of it. No fevers reported. No numbness or tingling. Toe Pain   The incident occurred 12 to 24 hours ago. The incident occurred at home. Injury mechanism: Trimming toenails to close. Pain location: Left great toe. The quality of the pain is described as aching. The pain is mild. The pain has been fluctuating since onset. Pertinent negatives include no inability to bear weight, loss of motion, loss of sensation, muscle weakness, numbness or tingling. She reports no foreign bodies present. The symptoms are aggravated by palpation. She has tried nothing for the symptoms. The treatment provided no relief.        Review of Systems   Review of Systems   Constitutional: Negative. Respiratory: Negative. Cardiovascular: Negative. Gastrointestinal: Negative. Musculoskeletal: Negative. Skin: Negative. Neurological: Negative. Negative for tingling and numbness. Current Medications       Current Outpatient Medications:   •  cephalexin (KEFLEX) 500 mg capsule, Take 1 capsule (500 mg total) by mouth every 8 (eight) hours for 7 days, Disp: 21 capsule, Rfl: 0  •  Multiple Vitamins-Minerals (multivitamin with minerals) tablet, Take 1 tablet by mouth daily, Disp: , Rfl:   •  naproxen sodium (ALEVE) 220 MG tablet, Take 220 mg by mouth 2 (two) times a day with meals, Disp: , Rfl:     Current Allergies     Allergies as of 09/13/2023   • (No Known Allergies)            The following portions of the patient's history were reviewed and updated as appropriate: allergies, current medications, past family history, past medical history, past social history, past surgical history and problem list.     History reviewed. No pertinent past medical history. Past Surgical History:   Procedure Laterality Date   • BREAST CYST EXCISION Right    • BREAST SURGERY     • CHOLECYSTECTOMY     • CHOLECYSTECTOMY LAPAROSCOPIC N/A 7/4/2020    Procedure: CHOLECYSTECTOMY LAPAROSCOPIC;  Surgeon: Ifrah Gomez MD;  Location: [facility];  Service: General       Family History   Problem Relation Age of Onset   • Lung cancer Mother    • No Known Problems Father    • No Known Problems Sister    • No Known Problems Daughter    • No Known Problems Daughter    • No Known Problems Maternal Grandmother    • No Known Problems Maternal Grandfather    • Hypertension Paternal Grandmother    • Heart disease Paternal Grandmother    • Hypertension Paternal Grandfather    • No Known Problems Half-Brother    • No Known Problems Half-Brother    • Breast cancer Neg Hx    • Ovarian cancer Neg Hx    • Cervical cancer Neg Hx    • Uterine cancer Neg Hx          Medications have been verified.         Objective   BP 144/88   Pulse 92   Temp (!) 97 °F (36.1 °C)   Resp 18   Ht 5' 2" (1.575 m)   Wt 74.8 kg (164 lb 14.5 oz)   LMP 04/25/2018 (Exact Date)   SpO2 99%   BMI 30.16 kg/m²   Patient's last menstrual period was 04/25/2018 (exact date). Physical Exam     Physical Exam  Vitals and nursing note reviewed. Constitutional:       General: She is not in acute distress. Appearance: She is well-developed. HENT:      Head: Normocephalic and atraumatic. Right Ear: External ear normal.      Left Ear: External ear normal.      Nose: Nose normal.      Mouth/Throat:      Pharynx: No oropharyngeal exudate. Eyes:      General:         Right eye: No discharge. Left eye: No discharge. Conjunctiva/sclera: Conjunctivae normal.   Pulmonary:      Effort: Pulmonary effort is normal. No respiratory distress. Musculoskeletal:         General: Normal range of motion. Cervical back: Normal range of motion and neck supple. Feet:    Feet:      Left foot:      Skin integrity: Erythema present. Toenail Condition: Left toenails are abnormally thick. Fungal disease present. Skin:     General: Skin is warm and dry. Neurological:      Mental Status: She is alert and oriented to person, place, and time.

## 2023-11-01 PROBLEM — O16.1 ELEVATED BLOOD PRESSURE AFFECTING PREGNANCY IN FIRST TRIMESTER, ANTEPARTUM: Status: RESOLVED | Noted: 2022-03-22 | Resolved: 2023-11-01

## 2024-01-04 ENCOUNTER — ANNUAL EXAM (OUTPATIENT)
Dept: OBGYN CLINIC | Facility: MEDICAL CENTER | Age: 64
End: 2024-01-04
Payer: COMMERCIAL

## 2024-01-04 VITALS
HEIGHT: 62 IN | BODY MASS INDEX: 29.81 KG/M2 | WEIGHT: 162 LBS | SYSTOLIC BLOOD PRESSURE: 124 MMHG | DIASTOLIC BLOOD PRESSURE: 80 MMHG

## 2024-01-04 DIAGNOSIS — Z12.31 ENCOUNTER FOR SCREENING MAMMOGRAM FOR BREAST CANCER: ICD-10-CM

## 2024-01-04 DIAGNOSIS — Z12.4 ENCOUNTER FOR SCREENING FOR CERVICAL CANCER: ICD-10-CM

## 2024-01-04 DIAGNOSIS — Z12.11 SCREENING FOR COLON CANCER: ICD-10-CM

## 2024-01-04 DIAGNOSIS — R63.5 WEIGHT GAIN: ICD-10-CM

## 2024-01-04 DIAGNOSIS — R87.610 ATYPICAL SQUAMOUS CELLS OF UNDETERMINED SIGNIFICANCE ON CYTOLOGIC SMEAR OF CERVIX (ASC-US): ICD-10-CM

## 2024-01-04 DIAGNOSIS — Z01.419 ENCOUNTER FOR ANNUAL ROUTINE GYNECOLOGICAL EXAMINATION: Primary | ICD-10-CM

## 2024-01-04 PROCEDURE — G0476 HPV COMBO ASSAY CA SCREEN: HCPCS | Performed by: CLINICAL NURSE SPECIALIST

## 2024-01-04 PROCEDURE — G0145 SCR C/V CYTO,THINLAYER,RESCR: HCPCS | Performed by: CLINICAL NURSE SPECIALIST

## 2024-01-04 PROCEDURE — S0612 ANNUAL GYNECOLOGICAL EXAMINA: HCPCS | Performed by: CLINICAL NURSE SPECIALIST

## 2024-01-04 NOTE — ASSESSMENT & PLAN NOTE
Pt concerned concerned over wt since menopause.  Discussed changes in hormone profile cause central wt gain around organs.  BMI is 29.  Encouraged to increase activity with 150 min purposeful exercise per wk, and consider consult to wt management. Declined at present.  No recent TSH found- will check now.

## 2024-01-04 NOTE — PROGRESS NOTES
Subjective:        Rupal Chapa is a 63 y.o. female. Here for Gynecologic Exam (Postmenopausal /Pap 5/9/22 repeat pap today /Mammo 5/16/23 bi-rads 2 /No colonoscopy on file )      GYN HPI  Menstrual cycle:  Patient is menopausal.  She denies hot flashes and vaginal dryness  Vaginal c/o: denies  Urinary c/o: deneis  Breast complaints:denies  She does do self breast Exams    Sexually active: not at present  Denies issues with sexual activit  Contraception: n/a  She reports she feels safe at home.     Pt concerned over weight.  Doesn't feel like she has changed her eating  Noting increased central wt gain.  Noted since menopause (5yrs ago) but moreso in the past 2 yrs.  Dietary calcium/vit D  intake: yes  Lifestyle: active lifestyle, but no consistent with exercise.    Hereditary Cancer Screening  Cancer-related family history includes Lung cancer in her mother. There is no history of Breast cancer, Ovarian cancer, Cervical cancer, or Uterine cancer.    Substance Abuse Screening Completed. See hx and flowsheet.    The following portions of the patient's history were reviewed and updated as appropriate: allergies, current medications, past family history, past medical history, past social history, past surgical history, and problem list.         Review of Systems   Constitutional:  Negative for appetite change, chills, fatigue, fever and unexpected weight change.   HENT: Negative.     Eyes: Negative.    Respiratory:  Negative for chest tightness and shortness of breath.    Cardiovascular:  Negative for chest pain and palpitations.   Gastrointestinal:  Negative for abdominal pain, constipation and vomiting.   Endocrine: Negative for cold intolerance and heat intolerance.   Genitourinary:         As per HPI   Musculoskeletal:  Negative for back pain, joint swelling and neck pain.   Skin:  Negative for color change and rash.   Neurological:  Negative for dizziness, weakness and numbness.   Hematological:  Does not  "bruise/bleed easily.   Psychiatric/Behavioral: Negative.               Objective:  /80 (BP Location: Left arm, Patient Position: Sitting, Cuff Size: Standard)   Ht 5' 2\" (1.575 m)   Wt 73.5 kg (162 lb)   LMP 04/25/2018 (Exact Date)   BMI 29.63 kg/m²        Physical Exam  Constitutional:       General: She is not in acute distress.     Appearance: Normal appearance.   Genitourinary:      Vulva and rectum normal.      No lesions in the vagina.      Right Labia: No rash or lesions.     Left Labia: No lesions or rash.     No vaginal discharge, erythema, tenderness or bleeding.        Right Adnexa: not tender and no mass present.     Left Adnexa: not tender and no mass present.     No cervical motion tenderness, discharge or friability.      Uterus is not enlarged or tender.      No urethral prolapse present.      Pelvic exam was performed with patient in the lithotomy position.   Breasts:     Breasts are symmetrical.      Right: No inverted nipple, mass, nipple discharge, skin change or tenderness.      Left: No inverted nipple, mass, nipple discharge, skin change or tenderness.   HENT:      Head: Normocephalic and atraumatic.   Cardiovascular:      Rate and Rhythm: Normal rate.      Heart sounds: No murmur heard.  Pulmonary:      Effort: Pulmonary effort is normal.      Breath sounds: Normal breath sounds.   Abdominal:      General: There is no distension.      Palpations: Abdomen is soft.      Tenderness: There is no abdominal tenderness.   Musculoskeletal:         General: Normal range of motion.      Cervical back: Normal range of motion.   Lymphadenopathy:      Cervical: No cervical adenopathy.   Neurological:      Mental Status: She is alert and oriented to person, place, and time.   Skin:     General: Skin is warm and dry.   Psychiatric:         Mood and Affect: Mood normal.         Behavior: Behavior normal.   Vitals reviewed.             Assessment/Plan:           ANNUAL GYN EXAM- Primary  Annual GYN " examination completed today.   Health maintenance reviewed/updated as appropriate    HEALTH MAINTENANCE SCREENINGS:    Last Papanicolaou test:  05/09/2022   History of abnormal pap: yes  Last mammogram:  05/16/2023  Last Colon Cancer Screening: colonoscopy: Not on file Cologuard:Not on file.       Cervical cancer screen :Previous pap smears and ASCCP screening guidelines have been reviewed. Pap collected.  Breast Health: Encouraged regular self breast exams  Risk prevention and anticipatory guidance provided including:  Age related Calcium and vitamin D intake  Dietary and lifestyle recommendations based on her age and weight. body mass index is 29.63 kg/m²..    Tobacco and alcohol use, intervention ordered if applicable.   Condom use for prevention of STI's. Declined STI Screening.  Contraception:  N/A- post menopause    Problem List Items Addressed This Visit       Atypical squamous cells of undetermined significance on cytologic smear of cervix (ASC-US)    Weight gain     Pt concerned concerned over wt since menopause.  Discussed changes in hormone profile cause central wt gain around organs.  BMI is 29.  Encouraged to increase activity with 150 min purposeful exercise per wk, and consider consult to wt management. Declined at present.  No recent TSH found- will check now.         Relevant Orders    TSH, 3rd generation with Free T4 reflex     Other Visit Diagnoses       Encounter for annual routine gynecological examination    -  Primary    Encounter for screening for cervical cancer        Relevant Orders    Liquid-based pap, screening    Screening for colon cancer        Relevant Orders    Ambulatory Referral to Gastroenterology    Encounter for screening mammogram for breast cancer        Relevant Orders    Mammo screening bilateral w 3d & cad            Orders Placed This Encounter   Procedures    Mammo screening bilateral w 3d & cad    TSH, 3rd generation with Free T4 reflex    Ambulatory Referral to  Gastroenterology

## 2024-01-05 LAB
HPV HR 12 DNA CVX QL NAA+PROBE: NEGATIVE
HPV16 DNA CVX QL NAA+PROBE: NEGATIVE
HPV18 DNA CVX QL NAA+PROBE: NEGATIVE

## 2024-01-10 LAB
LAB AP GYN PRIMARY INTERPRETATION: NORMAL
Lab: NORMAL

## 2024-01-11 ENCOUNTER — TELEPHONE (OUTPATIENT)
Dept: OBGYN CLINIC | Facility: CLINIC | Age: 64
End: 2024-01-11

## 2024-01-11 NOTE — TELEPHONE ENCOUNTER
----- Message from REINA Lerner sent at 1/11/2024 10:35 AM EST -----  Normal pap and HPV result  Please let her know (no mychart acct)

## 2024-01-24 ENCOUNTER — OFFICE VISIT (OUTPATIENT)
Dept: PODIATRY | Facility: CLINIC | Age: 64
End: 2024-01-24
Payer: COMMERCIAL

## 2024-01-24 VITALS
HEIGHT: 63 IN | HEART RATE: 101 BPM | WEIGHT: 155 LBS | SYSTOLIC BLOOD PRESSURE: 161 MMHG | DIASTOLIC BLOOD PRESSURE: 99 MMHG | BODY MASS INDEX: 27.46 KG/M2

## 2024-01-24 DIAGNOSIS — B35.3 TINEA PEDIS OF LEFT FOOT: Primary | ICD-10-CM

## 2024-01-24 DIAGNOSIS — B35.1 TINEA UNGUIUM: ICD-10-CM

## 2024-01-24 PROCEDURE — 99243 OFF/OP CNSLTJ NEW/EST LOW 30: CPT | Performed by: PODIATRIST

## 2024-01-24 NOTE — PROGRESS NOTES
"Assessment/Plan:    Explained to patient that she is dealing with both onychomycosis and tinea pedis left foot.  Explained that oral medication, Lamisil tablets, or needed to deal with these infections.  Patient referred for liver function test as a precursor to prescribing this medication.  Standard dosing of Lamisil will be prescribed as long as results are within normal limits.  This will be called in for the patient.    No problem-specific Assessment & Plan notes found for this encounter.       Diagnoses and all orders for this visit:    Tinea pedis of left foot    Tinea unguium  -     Ambulatory Referral to Podiatry  -     Hepatic function panel; Future          Subjective:      Patient ID: Rupal Chapa is a 63 y.o. female.    HPI    Patient, a 63-year-old female in apparent good health presents with concern regarding the toenails of the left foot and the skin on the bottom of the left foot.  Each left foot nail is thick and yellow with subungual debris.  The skin on the bottom of the left foot is dry and scaly.  Patient has tried multiple topical medications to no avail.  The nails and skin have been an issue for at least 1 year possibly longer.  No right foot pathology is present.    I personally reviewed a comprehensive metabolic panel dated 4/11/2022.  Liver enzymes were within normal limits.  However, in July 2020 the alkaline phosphatase level was slightly elevated at 125.    The following portions of the patient's history were reviewed and updated as appropriate: allergies, current medications, past family history, past medical history, past social history, past surgical history, and problem list.    Review of Systems   Musculoskeletal: Negative.    Skin:  Positive for rash.   Psychiatric/Behavioral: Negative.               Objective:      /99 (BP Location: Right arm, Patient Position: Sitting, Cuff Size: Standard)   Pulse 101   Ht 5' 2.5\" (1.588 m)   Wt 70.3 kg (155 lb)   LMP 04/25/2018 " (Exact Date)   BMI 27.90 kg/m²          Physical Exam  Constitutional:       Appearance: Normal appearance.   Cardiovascular:      Pulses: Normal pulses.   Musculoskeletal:         General: Normal range of motion.   Skin:     Findings: Rash present.      Comments: All left foot toenails are thick and yellow with subungual debris.  Skin on the sole of the left foot is dry and scaly.  There is no interdigital maceration left foot.  No skin pathology or nail pathology present on the right foot.   Neurological:      General: No focal deficit present.      Mental Status: She is oriented to person, place, and time.

## 2024-01-30 ENCOUNTER — APPOINTMENT (OUTPATIENT)
Dept: LAB | Facility: MEDICAL CENTER | Age: 64
End: 2024-01-30
Payer: COMMERCIAL

## 2024-01-30 DIAGNOSIS — B35.1 TINEA UNGUIUM: ICD-10-CM

## 2024-01-30 LAB
ALBUMIN SERPL BCP-MCNC: 4.3 G/DL (ref 3.5–5)
ALP SERPL-CCNC: 91 U/L (ref 34–104)
ALT SERPL W P-5'-P-CCNC: 21 U/L (ref 7–52)
AST SERPL W P-5'-P-CCNC: 18 U/L (ref 13–39)
BILIRUB DIRECT SERPL-MCNC: 0.11 MG/DL (ref 0–0.2)
BILIRUB SERPL-MCNC: 0.43 MG/DL (ref 0.2–1)
PROT SERPL-MCNC: 7.5 G/DL (ref 6.4–8.4)

## 2024-01-30 PROCEDURE — 36415 COLL VENOUS BLD VENIPUNCTURE: CPT

## 2024-01-30 PROCEDURE — 80076 HEPATIC FUNCTION PANEL: CPT

## 2024-01-31 DIAGNOSIS — B35.1 TINEA UNGUIUM: Primary | ICD-10-CM

## 2024-01-31 RX ORDER — TERBINAFINE HYDROCHLORIDE 250 MG/1
250 TABLET ORAL DAILY
Qty: 84 TABLET | Refills: 0 | Status: SHIPPED | OUTPATIENT
Start: 2024-01-31 | End: 2024-04-24

## 2024-03-11 ENCOUNTER — TELEPHONE (OUTPATIENT)
Dept: FAMILY MEDICINE CLINIC | Facility: CLINIC | Age: 64
End: 2024-03-11

## 2024-03-11 DIAGNOSIS — R91.8 PULMONARY NODULES/LESIONS, MULTIPLE: Primary | ICD-10-CM

## 2024-03-27 ENCOUNTER — OFFICE VISIT (OUTPATIENT)
Dept: PODIATRY | Facility: CLINIC | Age: 64
End: 2024-03-27
Payer: COMMERCIAL

## 2024-03-27 VITALS — HEART RATE: 92 BPM | DIASTOLIC BLOOD PRESSURE: 108 MMHG | SYSTOLIC BLOOD PRESSURE: 163 MMHG

## 2024-03-27 DIAGNOSIS — B35.1 TINEA UNGUIUM: Primary | ICD-10-CM

## 2024-03-27 DIAGNOSIS — B35.3 TINEA PEDIS OF LEFT FOOT: ICD-10-CM

## 2024-03-27 PROCEDURE — 99212 OFFICE O/P EST SF 10 MIN: CPT | Performed by: PODIATRIST

## 2024-03-27 NOTE — PROGRESS NOTES
Patient presents to assess her response to oral Lamisil to 50 mg.  Patient still has 3 weeks of medication to take.  The tinea on the sole of the left foot is virtually resolved.  Patient notes that she has had chronic problems with tinea.  Advised her to utilize a topical antifungal such as Lotrimin AF 2-3 times weekly.  Patient understands that toenails will not    Show evidence of improvement until 9 months have gone by.  We will reassess in 6 months.  She is having no adverse effects from the medication.

## 2024-05-17 ENCOUNTER — HOSPITAL ENCOUNTER (OUTPATIENT)
Dept: RADIOLOGY | Facility: MEDICAL CENTER | Age: 64
Discharge: HOME | End: 2024-05-17
Payer: COMMERCIAL

## 2024-05-17 VITALS — WEIGHT: 155 LBS | BODY MASS INDEX: 27.46 KG/M2 | HEIGHT: 63 IN

## 2024-05-17 DIAGNOSIS — Z12.31 ENCOUNTER FOR SCREENING MAMMOGRAM FOR BREAST CANCER: ICD-10-CM

## 2024-05-17 PROCEDURE — 77063 BREAST TOMOSYNTHESIS BI: CPT

## 2024-05-17 PROCEDURE — 77067 SCR MAMMO BI INCL CAD: CPT

## 2024-12-05 ENCOUNTER — TELEPHONE (OUTPATIENT)
Dept: FAMILY MEDICINE CLINIC | Facility: CLINIC | Age: 64
End: 2024-12-05

## 2025-01-08 ENCOUNTER — ANNUAL EXAM (OUTPATIENT)
Dept: OBGYN CLINIC | Facility: MEDICAL CENTER | Age: 65
End: 2025-01-08
Payer: COMMERCIAL

## 2025-01-08 VITALS
DIASTOLIC BLOOD PRESSURE: 100 MMHG | HEIGHT: 63 IN | BODY MASS INDEX: 28.7 KG/M2 | SYSTOLIC BLOOD PRESSURE: 138 MMHG | WEIGHT: 162 LBS

## 2025-01-08 DIAGNOSIS — Z12.11 SCREEN FOR COLON CANCER: ICD-10-CM

## 2025-01-08 DIAGNOSIS — Z01.419 ENCOUNTER FOR GYNECOLOGICAL EXAMINATION (GENERAL) (ROUTINE) WITHOUT ABNORMAL FINDINGS: Primary | ICD-10-CM

## 2025-01-08 DIAGNOSIS — Z12.4 SCREENING FOR CERVICAL CANCER: ICD-10-CM

## 2025-01-08 DIAGNOSIS — R87.610 ATYPICAL SQUAMOUS CELLS OF UNDETERMINED SIGNIFICANCE ON CYTOLOGIC SMEAR OF CERVIX (ASC-US): ICD-10-CM

## 2025-01-08 DIAGNOSIS — Z12.31 ENCOUNTER FOR SCREENING MAMMOGRAM FOR BREAST CANCER: ICD-10-CM

## 2025-01-08 DIAGNOSIS — R03.0 ELEVATED BLOOD PRESSURE READING: ICD-10-CM

## 2025-01-08 PROCEDURE — G0476 HPV COMBO ASSAY CA SCREEN: HCPCS | Performed by: CLINICAL NURSE SPECIALIST

## 2025-01-08 PROCEDURE — G0145 SCR C/V CYTO,THINLAYER,RESCR: HCPCS | Performed by: CLINICAL NURSE SPECIALIST

## 2025-01-08 PROCEDURE — S0612 ANNUAL GYNECOLOGICAL EXAMINA: HCPCS | Performed by: CLINICAL NURSE SPECIALIST

## 2025-01-08 NOTE — PATIENT INSTRUCTIONS
"Calcium and vitamin D for bone health (Beyond the Basics)    CALCIUM AND VITAMIN D OVERVIEW  Osteoporosis is a common bone disorder that causes a progressive loss in bone density and mass. As a result, bones become thin, weakened, and easily fractured. It is estimated that more than 1.3 million osteoporosis-associated (or \"osteoporotic\") fractures occur every year in the [place], primarily of bone within the spine (the vertebrae), the hip, and the forearm near the wrist. =    A number of treatments can help to prevent loss of bone and treat low bone mass. However, the first step in preventing or treating osteoporosis is to consume foods and drinks that provide calcium, a mineral essential for bone strength, and vitamin D, which aids in calcium breakdown and absorption. =    CALCIUM AND VITAMIN D BENEFITS  Good nutrition is important at all ages to keep the bones healthy.    Taking calcium reduces bone loss and decreases the risk of fracturing the vertebrae (the bones that surround the spinal cord).  Consuming calcium during childhood (eg, in milk) can lead to higher bone mass in adulthood. This increase in bone density can reduce the risk of fractures later in life.  Calcium may also have benefits in other body systems by reducing blood pressure and cholesterol levels.  Calcium and vitamin D supplements may help prevent tooth loss in older adults.    RECOMMENDATIONS FOR CALCIUM    General recommendations -- Premenopausal women and men should consume at least 1000 mg of calcium, while postmenopausal women should consume 1200 mg (total diet plus supplement). You should not consume more than 2000 mg of calcium per day (total diet plus supplement) due to the risk of side effects.    Calcium in the diet -- The primary sources of calcium in the diet include milk and other dairy products, such as hard cheese, cottage cheese, or yogurt, as well as green vegetables, such as kale and broccoli (table 1). Some cereals, " soy products, and fruit juices are fortified with calcium.    Calcium supplements -- The body is able to absorb calcium contained in supplements as well as from dietary sources. If it is not possible to get enough calcium from dietary sources, consult a health care provider to determine the best type, dose, and timing of calcium supplements.     Calcium carbonate is effective and is the least expensive form of calcium. It is best absorbed with a low-iron meal (such as breakfast). Calcium carbonate may not be absorbed well in people who also take a specific medication for gastroesophageal reflux (called a proton pump inhibitor or H2 blocker), which blocks stomach acid.  Many natural calcium carbonate preparations such as oyster shells or bone meal contain some lead.  Calcium citrate is well absorbed in the fasting state as well as with a meal.  Calcium doses above 500 mg are not absorbed as well as smaller doses, so large doses of supplements should be taken in divided doses (eg, in the morning and evening).  Calcium supplements do not replace other osteoporosis treatments such as hormone replacement, bisphosphonates (eg, risedronate [sample brand name: Actonel] and alendronate [brand name: Fosamax]), and raloxifene (brand name: Evista).    Calcium and vitamin D supplements alone are usually insufficient to prevent age-related bone loss, although they may be beneficial in some subgroups (older adults, those with very low intake). In most patients with or at risk for osteoporosis, the addition of medication or hormonal therapy is necessary in order to slow the breakdown and removal of bone (ie, resorption).     Underlying gastrointestinal diseases -- Patients who do not adequately absorb nutrients from the gastrointestinal tract (due to malabsorption) may require more than 1000 to 1200 mg of calcium per day. In such cases, a health care provider can help to determine the optimal dose of calcium.    Medications -- All  medications should be discussed with a health care provider to ensure that possible interactions with calcium are identified. Certain medications change the amount of calcium that is absorbed and/or excreted. As an example, loop diuretics (eg, furosemide [sample brand name: Lasix]) increase the amount of calcium excreted in the urine.    On the other hand, thiazide diuretics (eg, hydrochlorothiazide) can reduce levels of calcium in the urine, potentially reducing the risk of bone loss and kidney stones.    Side effects of calcium -- Calcium is usually easily tolerated when it is taken in divided doses several times per day. Some people experience side effects related to calcium, including constipation and indigestion. Calcium supplements interfere with the absorption of iron and thyroid hormone, and therefore, these medications should be taken at different times.    Kidney stones -- There is no evidence that consuming large amounts of calcium (from foods and drinks) increases the risk of kidney stones, or that avoiding dietary calcium decreases the risk. In fact, avoiding dairy products is likely to increase the risk of kidney stones.    However, use of calcium supplements may increase the risk of kidney stones in susceptible individuals by raising the level of calcium in the urine. This is particularly true if the supplement is taken between meals or at bedtime, and this is one of the reasons we prefer for patients to get as much of their calcium requirement through dietary means.     IMPORTANCE OF VITAMIN D  Vitamin D decreases bone loss and lowers the risk of fracture, especially in older men and women. Along with calcium, vitamin D also helps to prevent and treat osteoporosis. To absorb calcium efficiently, an adequate amount of vitamin D must be present.    Vitamin D is normally made in the skin after exposure to sunlight.    Recommendations for vitamin D -- The current recommendation is that men over 70 years  and postmenopausal women consume at least 800 international units (20 micrograms) of vitamin D per day. Lower levels of vitamin D are not as effective, while high doses can be toxic, especially if taken for long periods of time. Although the optimal intake has not been clearly established in premenopausal women or in younger men with osteoporosis, 600 international units (15 micrograms) of vitamin D daily is generally suggested.    Vitamin D is available as an individual supplement and is included in most multivitamins and some calcium supplements (table 2). Milk is a relatively good dietary source of vitamin D, with approximately 100 international units (2.5 micrograms) per cup (240 mL), and salmon has 800 to 1000 international units (20 to 25 micrograms) of vitamin D per serving.    Most healthy people don't need to check with their health care provider before taking standard doses of vitamin D and don't need monitoring of vitamin D levels if they are not being treated for vitamin D deficiency. However, recommendations for vitamin D supplementation may be different in people with certain underlying medical conditions, such as sarcoidosis or lymphoma. In these situations, a provider will determine if supplements are needed; if so, the person's vitamin D and calcium levels should be monitored with regular tests.    ©2021 [employer] All rights reserved.

## 2025-01-08 NOTE — PROGRESS NOTES
Name: Rupal Chapa      : 1960      MRN: [id number]  Encounter Provider: REINA Shahid  Encounter Date: 2025   Encounter department: [facility] [place]    :  Assessment & Plan  Encounter for gynecological examination (general) (routine) without abnormal findings  Annual GYN examination completed today.   Health maintenance reviewed/updated as appropriate  Risk prevention and anticipatory guidance provided including:  Encouraged regular self breast exams and to call with changes   Age related Calcium and vitamin D intake  Dietary and lifestyle recommendations based on her age and weight. body mass index is 29.16 kg/m²..    Tobacco and alcohol use, intervention ordered if applicable.   Condom use for prevention of STI's if sexually active.       Screening for cervical cancer    Orders:  •  Liquid-based pap, screening    Screen for colon cancer  Pt aware of need for colon cancer screening. Agreeable to Gi Consult.  Orders:  •  Ambulatory Referral to Gastroenterology; Future    Atypical squamous cells of undetermined significance on cytologic smear of cervix (ASC-US)  Most recent abn with HPV type other . Pap last yr NILM/neg HPV. Pap collected today       Encounter for screening mammogram for breast cancer  Due in May- encouraged to schedule   Orders:  •  Mammo screening bilateral w 3d and cad; Future    Elevated blood pressure reading  Several office visits over the past yr w/ BP > 140/90. Encouraged F/U with PCP  Consider getting home BP cuff to differentiate b/t white coat syndrome and HTN                Subjective:      History of Present Illness     Rupal Chapa is a 64 y.o. female. Here for Gynecologic Exam (Postmenopausal /Pap 24 -/-/Mammo 24 bi-rads 2 /Colonoscopy )      Pt is postmenopausal. She denies PMB.   Some hot flashes but less than   Does have some wt concerns.- more in the mid section/hips.  She denies any C/O abnormal vaginal  "discharge or irritation. Denies Urinary complaints or breast changes    Sexually active: not presently-   Vaginal dryness: Denies    She reports she feels safe at home.   Lifestyle/exercise: a few times per week  Dietary calcium/vit D  intake: adequate    HEALTH MAINTENANCE SCREENINGS:     Previous pap smears and ASCCP screening guidelines have been reviewed.   Last Pap:  01/04/2024; Next due today  History of abnormal pap: Yes, 2021 & 2022,   Last mammogram:  05/17/2024  Last Colon Cancer Screening: colonoscopy: Not on file Cologuard:Not on file. - aware of need    Hereditary Cancer Screening  FH Breast/Ovarian cancer: denies  FH Uterine cancer: denies  FH Colon cancer: denies    Substance Abuse Screening Completed. See hx and flowsheet.    Review of Systems   Constitutional:  Negative for appetite change, chills, fatigue, fever and unexpected weight change.   HENT: Negative.     Eyes: Negative.    Respiratory:  Negative for chest tightness and shortness of breath.    Cardiovascular:  Negative for chest pain and palpitations.   Gastrointestinal:  Negative for abdominal pain, constipation and vomiting.   Endocrine: Negative for cold intolerance and heat intolerance.   Genitourinary:         As per HPI   Musculoskeletal:  Negative for back pain, joint swelling and neck pain.   Skin:  Negative for color change and rash.   Neurological:  Negative for dizziness, weakness and numbness.   Hematological:  Does not bruise/bleed easily.   Psychiatric/Behavioral: Negative.         The following portions of the patient's history were reviewed and updated as appropriate: allergies, current medications, past family history, past medical history, past social history, past surgical history, and problem list.         Objective   /100 (BP Location: Left arm, Patient Position: Sitting, Cuff Size: Standard)   Ht 5' 2.5\" (1.588 m)   Wt 73.5 kg (162 lb)   LMP 04/25/2018 (Exact Date)   BMI 29.16 kg/m²     Physical " Exam  Constitutional:       General: She is not in acute distress.     Appearance: Normal appearance.   Genitourinary:      Vulva and rectum normal.      No lesions in the vagina.      Right Labia: No rash or lesions.     Left Labia: No lesions or rash.     No vaginal discharge, erythema, tenderness or bleeding.      Mild vaginal atrophy present.       Right Adnexa: not tender and no mass present.     Left Adnexa: not tender and no mass present.     No cervical motion tenderness, discharge or friability.      Uterus is not enlarged or tender.      No urethral prolapse present.      Pelvic exam was performed with patient in the lithotomy position.   Breasts:     Breasts are symmetrical.      Right: No inverted nipple, mass, nipple discharge, skin change or tenderness.      Left: No inverted nipple, mass, nipple discharge, skin change or tenderness.   HENT:      Head: Normocephalic and atraumatic.   Cardiovascular:      Rate and Rhythm: Normal rate.      Heart sounds: No murmur heard.  Pulmonary:      Effort: Pulmonary effort is normal.      Breath sounds: Normal breath sounds.   Abdominal:      General: There is no distension.      Palpations: Abdomen is soft.      Tenderness: There is no abdominal tenderness.   Musculoskeletal:         General: Normal range of motion.      Cervical back: Normal range of motion.   Lymphadenopathy:      Cervical: No cervical adenopathy.   Neurological:      Mental Status: She is alert and oriented to person, place, and time.   Skin:     General: Skin is warm and dry.   Psychiatric:         Mood and Affect: Mood normal.         Behavior: Behavior normal.   Vitals reviewed.

## 2025-01-13 LAB
LAB AP GYN PRIMARY INTERPRETATION: NORMAL
Lab: NORMAL

## 2025-01-14 ENCOUNTER — RESULTS FOLLOW-UP (OUTPATIENT)
Dept: OBGYN CLINIC | Facility: MEDICAL CENTER | Age: 65
End: 2025-01-14

## 2025-03-20 PROBLEM — R73.03 PREDIABETES: Status: ACTIVE | Noted: 2025-03-20

## 2025-04-23 NOTE — ASSESSMENT & PLAN NOTE
Check routine labs    Orders:  •  Hemoglobin A1C; Future  •  CBC and differential; Future  •  Comprehensive metabolic panel; Future  •  Lipid panel; Future  •  TSH, 3rd generation; Future  •  Urinalysis with microscopic

## 2025-04-23 NOTE — PROGRESS NOTES
Name: Rupal Chapa      : 1960      MRN: [id number]  Encounter Provider: Angela Velasquez MD  Encounter Date: 2025   Encounter department: [facility]  :  Assessment & Plan  Annual physical exam  Check routine labs    Orders:  •  Hemoglobin A1C; Future  •  CBC and differential; Future  •  Comprehensive metabolic panel; Future  •  Lipid panel; Future  •  TSH, 3rd generation; Future  •  Urinalysis with microscopic    Prediabetes  Check hga1c  Orders:  •  Hemoglobin A1C; Future    Encounter for immunization         Weight gain  Avoid sweets increase exercise        Trigger finger, right middle finger  Still has but slowly improving       Trigger middle finger of left hand  Still has but slowly improving       Pulmonary nodules  Pt did not  get follow up CT in past will reorder   Orders:  •  CT chest wo contrast; Future    Primary hypertension  Start meds valsartan 80mg po qd   side effects may include headache dizziness kidney reaction (will check labs 1 week )   Orders:  •  valsartan (DIOVAN) 80 mg tablet; Take 1 tablet (80 mg total) by mouth daily    Colon cancer screening    Orders:  •  Ambulatory Referral to Gastroenterology; Future           History of Present Illness   Patient here for annual physical prediabetes pt has concerns about weight  does exercise and  eats ok  pt had high BP at GYN family hx of HTN prediabetes         Review of Systems   Constitutional:  Negative for appetite change, chills, fatigue and fever.   Respiratory:  Negative for cough, chest tightness and shortness of breath.    Cardiovascular:  Negative for chest pain, palpitations and leg swelling.   Gastrointestinal:  Negative for abdominal pain, constipation, diarrhea, nausea and vomiting.   Genitourinary:  Negative for difficulty urinating and frequency.   Musculoskeletal:  Negative for arthralgias, back pain, gait problem and neck pain.   Skin:  Negative for rash.   Neurological:  Negative for  "dizziness, weakness, light-headedness, numbness and headaches.   Hematological:  Does not bruise/bleed easily.   Psychiatric/Behavioral:  Negative for dysphoric mood and sleep disturbance. The patient is not nervous/anxious.        Objective   /90   Pulse 86   Temp 98 °F (36.7 °C) (Tympanic)   Resp 16   Ht 5' 2.5\" (1.588 m)   Wt 75.8 kg (167 lb)   LMP 04/25/2018 (Exact Date)   SpO2 98%   BMI 30.06 kg/m²      Physical Exam  Vitals and nursing note reviewed.   Constitutional:       General: She is not in acute distress.     Appearance: Normal appearance. She is well-developed and normal weight.   HENT:      Head: Normocephalic and atraumatic.      Right Ear: Tympanic membrane, ear canal and external ear normal.      Left Ear: Tympanic membrane, ear canal and external ear normal.      Nose: Nose normal.      Mouth/Throat:      Mouth: Mucous membranes are moist.      Pharynx: Oropharynx is clear. No oropharyngeal exudate or posterior oropharyngeal erythema.   Eyes:      Extraocular Movements: Extraocular movements intact.      Conjunctiva/sclera: Conjunctivae normal.      Pupils: Pupils are equal, round, and reactive to light.   Cardiovascular:      Rate and Rhythm: Normal rate and regular rhythm.      Pulses: Normal pulses.      Heart sounds: Normal heart sounds. No murmur heard.  Pulmonary:      Effort: Pulmonary effort is normal. No respiratory distress.      Breath sounds: Normal breath sounds. No wheezing or rales.   Abdominal:      General: Bowel sounds are normal. There is no distension.      Palpations: Abdomen is soft. There is no mass.      Tenderness: There is no abdominal tenderness. There is no right CVA tenderness, left CVA tenderness, guarding or rebound.      Hernia: No hernia is present.   Musculoskeletal:         General: No swelling or tenderness. Normal range of motion.      Cervical back: Normal range of motion and neck supple.      Right lower leg: No edema.      Left lower leg: No " edema.   Skin:     General: Skin is warm and dry.      Capillary Refill: Capillary refill takes less than 2 seconds.      Findings: No rash.      Comments: No abn  moles seen   Neurological:      General: No focal deficit present.      Mental Status: She is alert and oriented to person, place, and time.      Cranial Nerves: No cranial nerve deficit.      Sensory: No sensory deficit.      Motor: No weakness.      Coordination: Coordination normal.      Gait: Gait normal.      Deep Tendon Reflexes: Reflexes normal.   Psychiatric:         Mood and Affect: Mood normal.         Behavior: Behavior normal.

## 2025-04-29 ENCOUNTER — OFFICE VISIT (OUTPATIENT)
Dept: FAMILY MEDICINE CLINIC | Facility: CLINIC | Age: 65
End: 2025-04-29
Payer: COMMERCIAL

## 2025-04-29 VITALS
TEMPERATURE: 98 F | DIASTOLIC BLOOD PRESSURE: 90 MMHG | RESPIRATION RATE: 16 BRPM | HEART RATE: 86 BPM | HEIGHT: 63 IN | BODY MASS INDEX: 29.59 KG/M2 | OXYGEN SATURATION: 98 % | WEIGHT: 167 LBS | SYSTOLIC BLOOD PRESSURE: 144 MMHG

## 2025-04-29 DIAGNOSIS — Z00.00 ANNUAL PHYSICAL EXAM: Primary | ICD-10-CM

## 2025-04-29 DIAGNOSIS — R73.03 PREDIABETES: ICD-10-CM

## 2025-04-29 DIAGNOSIS — Z12.11 COLON CANCER SCREENING: ICD-10-CM

## 2025-04-29 DIAGNOSIS — R91.8 PULMONARY NODULES: ICD-10-CM

## 2025-04-29 DIAGNOSIS — M65.332 TRIGGER MIDDLE FINGER OF LEFT HAND: ICD-10-CM

## 2025-04-29 DIAGNOSIS — M65.331 TRIGGER FINGER, RIGHT MIDDLE FINGER: ICD-10-CM

## 2025-04-29 DIAGNOSIS — I10 PRIMARY HYPERTENSION: ICD-10-CM

## 2025-04-29 DIAGNOSIS — R63.5 WEIGHT GAIN: ICD-10-CM

## 2025-04-29 DIAGNOSIS — Z23 ENCOUNTER FOR IMMUNIZATION: ICD-10-CM

## 2025-04-29 PROCEDURE — 99214 OFFICE O/P EST MOD 30 MIN: CPT | Performed by: FAMILY MEDICINE

## 2025-04-29 PROCEDURE — 99396 PREV VISIT EST AGE 40-64: CPT | Performed by: FAMILY MEDICINE

## 2025-04-29 RX ORDER — VALSARTAN 80 MG/1
80 TABLET ORAL DAILY
Qty: 30 TABLET | Refills: 5 | Status: SHIPPED | OUTPATIENT
Start: 2025-04-29

## 2025-04-29 NOTE — ASSESSMENT & PLAN NOTE
Start meds valsartan 80mg po qd   side effects may include headache dizziness kidney reaction (will check labs 1 week )   Orders:  •  valsartan (DIOVAN) 80 mg tablet; Take 1 tablet (80 mg total) by mouth daily

## 2025-04-30 ENCOUNTER — TELEPHONE (OUTPATIENT)
Age: 65
End: 2025-04-30

## 2025-04-30 NOTE — TELEPHONE ENCOUNTER
Patient called in from missed call to schedule CT scan. Transferred to central scheduling. NFA at this time

## 2025-05-23 ENCOUNTER — HOSPITAL ENCOUNTER (OUTPATIENT)
Dept: RADIOLOGY | Facility: MEDICAL CENTER | Age: 65
Discharge: HOME | End: 2025-05-23
Payer: COMMERCIAL

## 2025-05-23 VITALS — WEIGHT: 167 LBS | HEIGHT: 63 IN | BODY MASS INDEX: 29.59 KG/M2

## 2025-05-23 DIAGNOSIS — Z12.31 ENCOUNTER FOR SCREENING MAMMOGRAM FOR BREAST CANCER: ICD-10-CM

## 2025-05-23 PROCEDURE — 77063 BREAST TOMOSYNTHESIS BI: CPT

## 2025-05-23 PROCEDURE — 77067 SCR MAMMO BI INCL CAD: CPT

## 2025-05-23 NOTE — PROGRESS NOTES
"Name: Rupal Chapa      : 1960      MRN: [id number]  Encounter Provider: Angela Velasquez MD  Encounter Date: 2025   Encounter department: [facility]  :  Assessment & Plan  Primary hypertension   Pt was not on meds today last dose over 24 hours ago  follow up 2week         Prediabetes  Discussion on diet and exercise guidelines for weight loss and  health reviewed with pt                   History of Present Illness   Pt here for reevaluation of blood pressure now on  valsartan  however ran out yesterday and did not refill  so no meds taken today       Review of Systems   Respiratory:  Negative for cough, chest tightness and shortness of breath.    Cardiovascular:  Negative for chest pain, palpitations and leg swelling.   Neurological:  Negative for dizziness, weakness, light-headedness and headaches.   Psychiatric/Behavioral:  Negative for dysphoric mood. The patient is not nervous/anxious.        Objective   /88   Pulse (!) 115   Temp 98.8 °F (37.1 °C) (Tympanic)   Resp 16   Ht 5' 2.5\" (1.588 m)   Wt 73.9 kg (163 lb)   LMP 2018 (Exact Date)   SpO2 99%   BMI 29.34 kg/m²      Physical Exam  Constitutional:       Appearance: Normal appearance.     Cardiovascular:      Rate and Rhythm: Normal rate and regular rhythm.      Heart sounds: Normal heart sounds.   Pulmonary:      Effort: Pulmonary effort is normal.      Breath sounds: Normal breath sounds.     Musculoskeletal:      Right lower leg: No edema.      Left lower leg: No edema.     Neurological:      General: No focal deficit present.      Mental Status: She is oriented to person, place, and time. Mental status is at baseline.     Psychiatric:         Mood and Affect: Mood normal.         Behavior: Behavior normal.         "

## 2025-05-28 ENCOUNTER — RESULTS FOLLOW-UP (OUTPATIENT)
Dept: FAMILY MEDICINE CLINIC | Facility: CLINIC | Age: 65
End: 2025-05-28

## 2025-05-28 ENCOUNTER — APPOINTMENT (OUTPATIENT)
Dept: LAB | Facility: MEDICAL CENTER | Age: 65
End: 2025-05-28
Attending: FAMILY MEDICINE
Payer: COMMERCIAL

## 2025-05-28 DIAGNOSIS — Z00.00 ANNUAL PHYSICAL EXAM: ICD-10-CM

## 2025-05-28 DIAGNOSIS — R73.03 PREDIABETES: ICD-10-CM

## 2025-05-28 LAB
ALBUMIN SERPL BCG-MCNC: 4.6 G/DL (ref 3.5–5)
ALP SERPL-CCNC: 102 U/L (ref 34–104)
ALT SERPL W P-5'-P-CCNC: 20 U/L (ref 7–52)
ANION GAP SERPL CALCULATED.3IONS-SCNC: 9 MMOL/L (ref 4–13)
AST SERPL W P-5'-P-CCNC: 16 U/L (ref 13–39)
BACTERIA UR QL AUTO: ABNORMAL /HPF
BASOPHILS # BLD AUTO: 0.04 THOUSANDS/ÂΜL (ref 0–0.1)
BASOPHILS NFR BLD AUTO: 0 % (ref 0–1)
BILIRUB SERPL-MCNC: 0.42 MG/DL (ref 0.2–1)
BILIRUB UR QL STRIP: NEGATIVE
BUN SERPL-MCNC: 20 MG/DL (ref 5–25)
CALCIUM SERPL-MCNC: 10 MG/DL (ref 8.4–10.2)
CHLORIDE SERPL-SCNC: 105 MMOL/L (ref 96–108)
CHOLEST SERPL-MCNC: 152 MG/DL (ref ?–200)
CLARITY UR: CLEAR
CO2 SERPL-SCNC: 28 MMOL/L (ref 21–32)
COLOR UR: COLORLESS
CREAT SERPL-MCNC: 0.83 MG/DL (ref 0.6–1.3)
EOSINOPHIL # BLD AUTO: 0.1 THOUSAND/ÂΜL (ref 0–0.61)
EOSINOPHIL NFR BLD AUTO: 1 % (ref 0–6)
ERYTHROCYTE [DISTWIDTH] IN BLOOD BY AUTOMATED COUNT: 13.5 % (ref 11.6–15.1)
EST. AVERAGE GLUCOSE BLD GHB EST-MCNC: 126 MG/DL
GFR SERPL CREATININE-BSD FRML MDRD: 75 ML/MIN/1.73SQ M
GLUCOSE P FAST SERPL-MCNC: 112 MG/DL (ref 65–99)
GLUCOSE UR STRIP-MCNC: NEGATIVE MG/DL
HBA1C MFR BLD: 6 %
HCT VFR BLD AUTO: 49.4 % (ref 34.8–46.1)
HDLC SERPL-MCNC: 58 MG/DL
HGB BLD-MCNC: 15.3 G/DL (ref 11.5–15.4)
HGB UR QL STRIP.AUTO: NEGATIVE
IMM GRANULOCYTES # BLD AUTO: 0.05 THOUSAND/UL (ref 0–0.2)
IMM GRANULOCYTES NFR BLD AUTO: 1 % (ref 0–2)
KETONES UR STRIP-MCNC: NEGATIVE MG/DL
LDLC SERPL CALC-MCNC: 77 MG/DL (ref 0–100)
LEUKOCYTE ESTERASE UR QL STRIP: ABNORMAL
LYMPHOCYTES # BLD AUTO: 2.49 THOUSANDS/ÂΜL (ref 0.6–4.47)
LYMPHOCYTES NFR BLD AUTO: 26 % (ref 14–44)
MCH RBC QN AUTO: 28.5 PG (ref 26.8–34.3)
MCHC RBC AUTO-ENTMCNC: 31 G/DL (ref 31.4–37.4)
MCV RBC AUTO: 92 FL (ref 82–98)
MONOCYTES # BLD AUTO: 0.72 THOUSAND/ÂΜL (ref 0.17–1.22)
MONOCYTES NFR BLD AUTO: 8 % (ref 4–12)
NEUTROPHILS # BLD AUTO: 6.04 THOUSANDS/ÂΜL (ref 1.85–7.62)
NEUTS SEG NFR BLD AUTO: 64 % (ref 43–75)
NITRITE UR QL STRIP: NEGATIVE
NON-SQ EPI CELLS URNS QL MICRO: ABNORMAL /HPF
NONHDLC SERPL-MCNC: 94 MG/DL
NRBC BLD AUTO-RTO: 0 /100 WBCS
PH UR STRIP.AUTO: 6 [PH]
PLATELET # BLD AUTO: 262 THOUSANDS/UL (ref 149–390)
PMV BLD AUTO: 11.7 FL (ref 8.9–12.7)
POTASSIUM SERPL-SCNC: 4.6 MMOL/L (ref 3.5–5.3)
PROT SERPL-MCNC: 7.6 G/DL (ref 6.4–8.4)
PROT UR STRIP-MCNC: NEGATIVE MG/DL
RBC # BLD AUTO: 5.36 MILLION/UL (ref 3.81–5.12)
RBC #/AREA URNS AUTO: ABNORMAL /HPF
SODIUM SERPL-SCNC: 142 MMOL/L (ref 135–147)
SP GR UR STRIP.AUTO: 1.02 (ref 1–1.03)
TRIGL SERPL-MCNC: 87 MG/DL (ref ?–150)
TSH SERPL DL<=0.05 MIU/L-ACNC: 2.86 UIU/ML (ref 0.45–4.5)
UROBILINOGEN UR STRIP-ACNC: <2 MG/DL
WBC # BLD AUTO: 9.44 THOUSAND/UL (ref 4.31–10.16)
WBC #/AREA URNS AUTO: ABNORMAL /HPF

## 2025-05-28 PROCEDURE — 84443 ASSAY THYROID STIM HORMONE: CPT

## 2025-05-28 PROCEDURE — 80061 LIPID PANEL: CPT

## 2025-05-28 PROCEDURE — 83036 HEMOGLOBIN GLYCOSYLATED A1C: CPT

## 2025-05-28 PROCEDURE — 80053 COMPREHEN METABOLIC PANEL: CPT

## 2025-05-28 PROCEDURE — 36415 COLL VENOUS BLD VENIPUNCTURE: CPT

## 2025-05-28 PROCEDURE — 85025 COMPLETE CBC W/AUTO DIFF WBC: CPT

## 2025-05-29 ENCOUNTER — OFFICE VISIT (OUTPATIENT)
Dept: FAMILY MEDICINE CLINIC | Facility: CLINIC | Age: 65
End: 2025-05-29
Payer: COMMERCIAL

## 2025-05-29 VITALS
DIASTOLIC BLOOD PRESSURE: 88 MMHG | WEIGHT: 163 LBS | RESPIRATION RATE: 16 BRPM | HEART RATE: 115 BPM | TEMPERATURE: 98.8 F | SYSTOLIC BLOOD PRESSURE: 144 MMHG | BODY MASS INDEX: 28.88 KG/M2 | OXYGEN SATURATION: 99 % | HEIGHT: 63 IN

## 2025-05-29 DIAGNOSIS — R73.03 PREDIABETES: ICD-10-CM

## 2025-05-29 DIAGNOSIS — I10 PRIMARY HYPERTENSION: Primary | ICD-10-CM

## 2025-05-29 PROCEDURE — 99213 OFFICE O/P EST LOW 20 MIN: CPT | Performed by: FAMILY MEDICINE

## 2025-06-02 NOTE — PROGRESS NOTES
"Name: Rupal Chapa      : 1960      MRN: [id number]  Encounter Provider: Angela Velasquez MD  Encounter Date: 2025   Encounter department: [facility]  :  Assessment & Plan  Primary hypertension  Now on increased valsartan 160mg po qd  well controlled          Encounter for immunization                History of Present Illness   Pt here for reevaluation of elevated bp now on increased valsartan      Review of Systems   Respiratory:  Negative for cough, chest tightness and shortness of breath.    Cardiovascular:  Negative for chest pain, palpitations and leg swelling.   Neurological:  Negative for dizziness, weakness, light-headedness and headaches.   Psychiatric/Behavioral:  Negative for dysphoric mood. The patient is not nervous/anxious.        Objective   /78 (BP Location: Left arm)   Temp 98 °F (36.7 °C) (Tympanic)   Resp 16   Ht 5' 2.5\" (1.588 m)   Wt 73.9 kg (163 lb)   LMP 2018 (Exact Date)   SpO2 99%   BMI 29.34 kg/m²      Physical Exam  Constitutional:       Appearance: Normal appearance.     Cardiovascular:      Rate and Rhythm: Normal rate and regular rhythm.      Heart sounds: Normal heart sounds.   Pulmonary:      Effort: Pulmonary effort is normal.      Breath sounds: Normal breath sounds.     Musculoskeletal:      Right lower leg: No edema.      Left lower leg: No edema.     Neurological:      General: No focal deficit present.      Mental Status: She is oriented to person, place, and time. Mental status is at baseline.     Psychiatric:         Mood and Affect: Mood normal.         Behavior: Behavior normal.         "

## 2025-06-06 ENCOUNTER — OFFICE VISIT (OUTPATIENT)
Dept: FAMILY MEDICINE CLINIC | Facility: CLINIC | Age: 65
End: 2025-06-06
Payer: COMMERCIAL

## 2025-06-06 VITALS
OXYGEN SATURATION: 99 % | HEIGHT: 63 IN | TEMPERATURE: 98 F | DIASTOLIC BLOOD PRESSURE: 78 MMHG | RESPIRATION RATE: 16 BRPM | WEIGHT: 163 LBS | BODY MASS INDEX: 28.88 KG/M2 | SYSTOLIC BLOOD PRESSURE: 120 MMHG

## 2025-06-06 DIAGNOSIS — I10 PRIMARY HYPERTENSION: Primary | ICD-10-CM

## 2025-06-06 DIAGNOSIS — Z23 ENCOUNTER FOR IMMUNIZATION: ICD-10-CM

## 2025-06-06 PROCEDURE — 90471 IMMUNIZATION ADMIN: CPT | Performed by: FAMILY MEDICINE

## 2025-06-06 PROCEDURE — 90750 HZV VACC RECOMBINANT IM: CPT | Performed by: FAMILY MEDICINE

## 2025-06-06 PROCEDURE — 99213 OFFICE O/P EST LOW 20 MIN: CPT | Performed by: FAMILY MEDICINE

## 2025-07-31 ENCOUNTER — OFFICE VISIT (OUTPATIENT)
Dept: FAMILY MEDICINE CLINIC | Facility: CLINIC | Age: 65
End: 2025-07-31
Payer: COMMERCIAL

## 2025-07-31 VITALS
OXYGEN SATURATION: 99 % | SYSTOLIC BLOOD PRESSURE: 142 MMHG | HEART RATE: 86 BPM | RESPIRATION RATE: 16 BRPM | BODY MASS INDEX: 29.23 KG/M2 | DIASTOLIC BLOOD PRESSURE: 80 MMHG | TEMPERATURE: 98 F | HEIGHT: 63 IN | WEIGHT: 165 LBS

## 2025-07-31 DIAGNOSIS — I10 PRIMARY HYPERTENSION: Primary | ICD-10-CM

## 2025-07-31 DIAGNOSIS — N60.81 SEBACEOUS CYST OF BREAST, RIGHT: ICD-10-CM

## 2025-07-31 PROCEDURE — 99213 OFFICE O/P EST LOW 20 MIN: CPT | Performed by: FAMILY MEDICINE

## (undated) DEVICE — DRAPE UTILITY

## (undated) DEVICE — SHEARS MONOPOL MINI 5MM X 31CM RATCHET HNDL

## (undated) DEVICE — 3M™ TEGADERM™ TRANSPARENT FILM DRESSING FRAME STYLE, 1626W, 4 IN X 4-3/4 IN (10 CM X 12 CM), 50/CT 4CT/CASE: Brand: 3M™ TEGADERM™

## (undated) DEVICE — TROCARS: Brand: KII® BALLOON BLUNT TIP SYSTEM

## (undated) DEVICE — SUT MONOCRYL 4-0 PC-5 18 IN Y823G

## (undated) DEVICE — SUT VICRYL PLUS 0 CT-3 27 IN VCP329H

## (undated) DEVICE — GLOVE INDICATOR PI UNDERGLOVE SZ 7.5 BLUE

## (undated) DEVICE — 3M™ TEGADERM™ TRANSPARENT FILM DRESSING FRAME STYLE, 1624W, 2-3/8 IN X 2-3/4 IN (6 CM X 7 CM), 100/CT 4CT/CASE: Brand: 3M™ TEGADERM™

## (undated) DEVICE — IRRIG ENDO FLO TUBING

## (undated) DEVICE — GAUZE SPONGES,8 PLY: Brand: CURITY

## (undated) DEVICE — TROCAR: Brand: KII FIOS FIRST ENTRY

## (undated) DEVICE — TISSUE RETRIEVAL SYSTEM: Brand: INZII RETRIEVAL SYSTEM

## (undated) DEVICE — CHLORAPREP HI-LITE 26ML ORANGE

## (undated) DEVICE — BASIC DOUBLE BASIN 2-LF: Brand: MEDLINE INDUSTRIES, INC.

## (undated) DEVICE — SCD SEQUENTIAL COMPRESSION COMFORT SLEEVE MEDIUM KNEE LENGTH: Brand: KENDALL SCD

## (undated) DEVICE — ASTOUND STANDARD SURGICAL GOWN, XL: Brand: CONVERTORS

## (undated) DEVICE — GLOVE SRG BIOGEL 7

## (undated) DEVICE — PACK GENERAL LF

## (undated) DEVICE — DRAPE LAPAROTOMY W/POUCHES

## (undated) DEVICE — INTENDED FOR TISSUE SEPARATION, AND OTHER PROCEDURES THAT REQUIRE A SHARP SURGICAL BLADE TO PUNCTURE OR CUT.: Brand: BARD-PARKER SAFETY BLADES SIZE 15, STERILE

## (undated) DEVICE — ENDO CLIP II 10MM PISTOL GRIP SINGLE USE CLIP APPLIER

## (undated) DEVICE — LATEX FREE 10 FT  INSUFFLATION TUBING, COLDER CONNECTOR WITH 1 MICRON FILTER STERILE ONE TIME USE, 20 U: Brand: SURGICAL DIRECT

## (undated) DEVICE — ANTI-FOG SOLUTION WITH FOAM PAD: Brand: DEVON

## (undated) DEVICE — TROCAR: Brand: KII SLEEVE